# Patient Record
Sex: FEMALE | Race: WHITE | Employment: OTHER | URBAN - METROPOLITAN AREA
[De-identification: names, ages, dates, MRNs, and addresses within clinical notes are randomized per-mention and may not be internally consistent; named-entity substitution may affect disease eponyms.]

---

## 2017-01-23 ENCOUNTER — OP HISTORICAL/CONVERTED ENCOUNTER (OUTPATIENT)
Dept: OTHER | Age: 72
End: 2017-01-23

## 2017-02-20 ENCOUNTER — OP HISTORICAL/CONVERTED ENCOUNTER (OUTPATIENT)
Dept: OTHER | Age: 72
End: 2017-02-20

## 2018-01-24 ENCOUNTER — OP HISTORICAL/CONVERTED ENCOUNTER (OUTPATIENT)
Dept: OTHER | Age: 73
End: 2018-01-24

## 2018-02-06 ENCOUNTER — OP HISTORICAL/CONVERTED ENCOUNTER (OUTPATIENT)
Dept: OTHER | Age: 73
End: 2018-02-06

## 2018-02-20 ENCOUNTER — OP HISTORICAL/CONVERTED ENCOUNTER (OUTPATIENT)
Dept: OTHER | Age: 73
End: 2018-02-20

## 2018-03-06 ENCOUNTER — OP HISTORICAL/CONVERTED ENCOUNTER (OUTPATIENT)
Dept: OTHER | Age: 73
End: 2018-03-06

## 2018-03-06 LAB — AMB DEXA, EXTERNAL: NORMAL

## 2018-08-06 ENCOUNTER — OP HISTORICAL/CONVERTED ENCOUNTER (OUTPATIENT)
Dept: OTHER | Age: 73
End: 2018-08-06

## 2018-08-22 ENCOUNTER — OP HISTORICAL/CONVERTED ENCOUNTER (OUTPATIENT)
Dept: OTHER | Age: 73
End: 2018-08-22

## 2018-10-22 ENCOUNTER — ED HISTORICAL/CONVERTED ENCOUNTER (OUTPATIENT)
Dept: OTHER | Age: 73
End: 2018-10-22

## 2018-11-27 ENCOUNTER — OP HISTORICAL/CONVERTED ENCOUNTER (OUTPATIENT)
Dept: OTHER | Age: 73
End: 2018-11-27

## 2018-12-07 ENCOUNTER — OP HISTORICAL/CONVERTED ENCOUNTER (OUTPATIENT)
Dept: OTHER | Age: 73
End: 2018-12-07

## 2019-02-06 ENCOUNTER — OP HISTORICAL/CONVERTED ENCOUNTER (OUTPATIENT)
Dept: OTHER | Age: 74
End: 2019-02-06

## 2019-02-15 ENCOUNTER — OP HISTORICAL/CONVERTED ENCOUNTER (OUTPATIENT)
Dept: OTHER | Age: 74
End: 2019-02-15

## 2019-02-15 LAB — MAMMOGRAPHY, EXTERNAL: NORMAL

## 2019-02-20 LAB
A/G RATIO, EXTERNAL: 1.9
ALBUMIN, EXTERNAL: 4.4
ALK PHOS, EXTERNAL: 48
ANION GAP BLD CALC-SCNC: NORMAL MMOL/L
BILI DIRECT, EXTERNAL: NORMAL
BILI TOTAL, EXTERNAL: 0.8
BUN BLD-MCNC: 12 MG/DL
BUN/CREATININE RATIO, EXTERNAL: 14
CALCIUM, EXTERNAL: 9.2
CALCIUM, ION, EXTERNAL: NORMAL
CHLORIDE, EXTERNAL: 105
CO2, EXTERNAL: 23
CREATININE SER, EXTERNAL: 0.83
EGFR IF AFA, EXTERNAL: 82
EGFR NOT AFA, EXTERNAL: 71
GLOBULIN, EXTERNAL: 2.3
GLUCOSE SER, EXTERNAL: 88
POTASSIUM, EXTERNAL: 3.8
PROTEIN TOT, EXTERNAL: 6.7
SGOT (AST), EXTERNAL: 26
SGPT (ALT), EXTERNAL: 19
SODIUM, EXTERNAL: 143
TSH SERPL-ACNC: 2.15 M[IU]/L

## 2019-08-07 ENCOUNTER — OP HISTORICAL/CONVERTED ENCOUNTER (OUTPATIENT)
Dept: OTHER | Age: 74
End: 2019-08-07

## 2019-09-17 ENCOUNTER — ED HISTORICAL/CONVERTED ENCOUNTER (OUTPATIENT)
Dept: OTHER | Age: 74
End: 2019-09-17

## 2020-02-20 ENCOUNTER — OP HISTORICAL/CONVERTED ENCOUNTER (OUTPATIENT)
Dept: OTHER | Age: 75
End: 2020-02-20

## 2020-02-25 ENCOUNTER — IP HISTORICAL/CONVERTED ENCOUNTER (OUTPATIENT)
Dept: OTHER | Age: 75
End: 2020-02-25

## 2020-05-15 ENCOUNTER — OP HISTORICAL/CONVERTED ENCOUNTER (OUTPATIENT)
Dept: OTHER | Age: 75
End: 2020-05-15

## 2020-07-30 VITALS
OXYGEN SATURATION: 96 % | HEIGHT: 66 IN | HEART RATE: 70 BPM | TEMPERATURE: 98.1 F | SYSTOLIC BLOOD PRESSURE: 116 MMHG | BODY MASS INDEX: 27.8 KG/M2 | WEIGHT: 173 LBS | DIASTOLIC BLOOD PRESSURE: 58 MMHG

## 2020-07-30 PROBLEM — M94.9 DISORDER OF BONE AND ARTICULAR CARTILAGE: Status: ACTIVE | Noted: 2020-07-30

## 2020-07-30 PROBLEM — E78.00 PURE HYPERCHOLESTEROLEMIA: Status: ACTIVE | Noted: 2020-07-30

## 2020-07-30 PROBLEM — I48.91 ATRIAL FIBRILLATION (HCC): Status: ACTIVE | Noted: 2020-07-30

## 2020-07-30 PROBLEM — M89.9 DISORDER OF BONE AND ARTICULAR CARTILAGE: Status: ACTIVE | Noted: 2020-07-30

## 2020-07-30 PROBLEM — M54.9 BACK ACHE: Status: ACTIVE | Noted: 2020-07-30

## 2020-07-30 PROBLEM — E55.9 VITAMIN D DEFICIENCY: Status: ACTIVE | Noted: 2020-07-30

## 2020-07-30 PROBLEM — K21.9 GERD (GASTROESOPHAGEAL REFLUX DISEASE): Status: ACTIVE | Noted: 2020-07-30

## 2020-07-30 PROBLEM — M19.90 ARTHRITIS: Status: ACTIVE | Noted: 2020-07-30

## 2020-07-30 RX ORDER — HYDROCODONE BITARTRATE AND ACETAMINOPHEN 5; 325 MG/1; MG/1
TABLET ORAL
COMMUNITY
End: 2021-02-11 | Stop reason: ALTCHOICE

## 2020-07-30 RX ORDER — BACLOFEN 10 MG/1
TABLET ORAL 3 TIMES DAILY
COMMUNITY
End: 2021-08-18 | Stop reason: ALTCHOICE

## 2020-07-30 RX ORDER — GABAPENTIN 300 MG/1
300 CAPSULE ORAL 3 TIMES DAILY
COMMUNITY
End: 2021-08-18 | Stop reason: ALTCHOICE

## 2020-07-30 RX ORDER — GLUCOSAMINE/CHONDR SU A SOD 167-133 MG
500 CAPSULE ORAL 2 TIMES DAILY WITH MEALS
COMMUNITY

## 2020-07-30 RX ORDER — IBUPROFEN 200 MG
CAPSULE ORAL 2 TIMES DAILY
COMMUNITY

## 2020-07-30 RX ORDER — VITAMIN E 268 MG
CAPSULE ORAL DAILY
COMMUNITY

## 2020-07-30 RX ORDER — PANTOPRAZOLE SODIUM 40 MG/1
40 TABLET, DELAYED RELEASE ORAL DAILY
COMMUNITY
End: 2021-01-13

## 2020-07-30 RX ORDER — ROPINIROLE 2 MG/1
TABLET, FILM COATED ORAL
COMMUNITY
End: 2021-08-16

## 2020-07-30 RX ORDER — ATORVASTATIN CALCIUM 10 MG/1
TABLET, FILM COATED ORAL DAILY
COMMUNITY
End: 2020-08-10

## 2020-07-30 RX ORDER — ATENOLOL 50 MG/1
TABLET ORAL DAILY
COMMUNITY

## 2020-07-30 RX ORDER — DICLOFENAC POTASSIUM 50 MG/1
50 TABLET, FILM COATED ORAL 2 TIMES DAILY
COMMUNITY
End: 2021-08-18 | Stop reason: ALTCHOICE

## 2020-07-30 RX ORDER — IBANDRONATE SODIUM 150 MG/1
150 TABLET, FILM COATED ORAL
COMMUNITY
End: 2020-12-24

## 2020-08-10 RX ORDER — ATORVASTATIN CALCIUM 10 MG/1
TABLET, FILM COATED ORAL
Qty: 90 TAB | Refills: 4 | Status: SHIPPED | OUTPATIENT
Start: 2020-08-10 | End: 2021-08-16

## 2020-08-12 ENCOUNTER — OFFICE VISIT (OUTPATIENT)
Dept: FAMILY MEDICINE CLINIC | Age: 75
End: 2020-08-12
Payer: MEDICARE

## 2020-08-12 VITALS
TEMPERATURE: 96.2 F | WEIGHT: 179 LBS | OXYGEN SATURATION: 96 % | DIASTOLIC BLOOD PRESSURE: 74 MMHG | BODY MASS INDEX: 28.89 KG/M2 | SYSTOLIC BLOOD PRESSURE: 135 MMHG | HEART RATE: 77 BPM

## 2020-08-12 DIAGNOSIS — M54.50 CHRONIC BILATERAL LOW BACK PAIN, UNSPECIFIED WHETHER SCIATICA PRESENT: Primary | ICD-10-CM

## 2020-08-12 DIAGNOSIS — G89.29 CHRONIC BILATERAL LOW BACK PAIN, UNSPECIFIED WHETHER SCIATICA PRESENT: Primary | ICD-10-CM

## 2020-08-12 DIAGNOSIS — K21.9 GASTROESOPHAGEAL REFLUX DISEASE WITHOUT ESOPHAGITIS: ICD-10-CM

## 2020-08-12 DIAGNOSIS — M19.90 ARTHRITIS: ICD-10-CM

## 2020-08-12 DIAGNOSIS — E78.00 PURE HYPERCHOLESTEROLEMIA: ICD-10-CM

## 2020-08-12 DIAGNOSIS — G25.81 RESTLESS LEG SYNDROME: ICD-10-CM

## 2020-08-12 DIAGNOSIS — I48.91 ATRIAL FIBRILLATION, UNSPECIFIED TYPE (HCC): ICD-10-CM

## 2020-08-12 PROCEDURE — 99214 OFFICE O/P EST MOD 30 MIN: CPT | Performed by: FAMILY MEDICINE

## 2020-08-12 NOTE — PATIENT INSTRUCTIONS
General Health and concerns: HEART HEALTHY DIET: 
A heart healthy diet is one that is low in cholesterol (less than 300 mg daily), fat (less than 80 g daily) . You should also minimize carbohydrates / sugars (less amounts of breads, pastas, potato and potato products and sugary foods/snacks, cookies, cakes, etc) . Try to eat whole wheat/multigrain breads and pastas and eat more vegetables. Cook with olive oil (or no oil) and grill, bake, broil or boil foods. Less red meat and more chicken , fish and lean cuts of beef (limited). 7041-7622 calories per day is sufficient 1514-6394 is acceptable for weight loss. EXERCISE: 
You should do exercise 3-5 days per week (minimum) to include increasing your heart rate for 30 to 45 minutes. At least a pace of a brisk walk should do that. This build up your heart and lung endurance and muscles and helps many function of the body. OTHER: 
    Routine Health maintenance: You need to get a yearly follow up/physical exam to review, discuss age and gender appropriate exams, labs, vaccines and screening tests. This includes cardiovascular health risk, cancer screens and other pierce related topics. Medications-take all medications as directed. Please do not stop unless you talk to your doctor or health care provider first. Report any problems immediately. Referrals: if you have been given a referral, please call the office if you do not hear from provider in one week. You may make the appointment yourself. Please keep all appointments with specialists and ask them to send their notes, thoughts, recommendations to us , as your PCP. Imaging/Labs:  Be sure to get these images in a timely manner. IF your test must be scheduled, let us know if you need help getting this done and if you do not hear from that provider in a week , call us or them.   BE SURE to call the office if you do not hear regarding the results in one week after the test is performed Image or lab). It is our intention to inform you of the results ALWAYS, even if normal you should get a notification (Call, portal message). PLEASE laury if you do not get the results. PLEASE follow all recommendations and call/come in /ask questions if you do not understand of if problems develop after or in between visits. Failure to comply with recommended health care advise could result in serious health consequences. Thank you for choosing our practice and please let us know how we can help you feel better and stay well!

## 2020-08-12 NOTE — PROGRESS NOTES
Subjective  Dede Wolfe is a 69 y.o. female. HPI   Patient Active Problem List   Diagnosis Code    Arthritis M19.90    Atrial fibrillation ([facility] [address] I48.91    Back ache M54.9    Disorder of bone and articular cartilage M89.9, M94.9    GERD (gastroesophageal reflux disease) K21.9    Pure hypercholesterolemia E78.00    Vitamin D deficiency E55.9     Enio Frazier comes ni for a follow up on the above medical problems. Complains of restless legs, takes her two hours to fall asleep EVEN while on the Ropinerol for rls. Starting to feel in arms. Also complains of weihgt going up Had back surgery in February this year and not been as mobile. Has no leg pain or weakness. Review of Systems   Constitutional: Positive for malaise/fatigue. Negative for chills, diaphoresis, fever and weight loss (Inability to lose weight). HENT: Negative for congestion, ear pain, hearing loss, sore throat and tinnitus. Eyes: Negative for blurred vision, double vision and photophobia. Respiratory: Negative for cough, sputum production, shortness of breath and wheezing. Cardiovascular: Negative for chest pain, palpitations, orthopnea and leg swelling. Gastrointestinal: Negative for abdominal pain, blood in stool, constipation, diarrhea, heartburn, melena, nausea and vomiting. Genitourinary: Negative for dysuria, frequency and urgency. Musculoskeletal: Positive for back pain. Negative for joint pain, myalgias and neck pain. Skin: Negative for itching and rash. Neurological: Negative for dizziness, tingling, tremors, sensory change, focal weakness and headaches. Restlessness. Legs at bedtime and now arms. Psychiatric/Behavioral: Negative for depression and suicidal ideas. The patient is not nervous/anxious and does not have insomnia.         Objective  Visit Vitals  /74 (BP 1 Location: Left arm, BP Patient Position: Sitting)   Pulse 77   Temp (!) 96.2 °F (35.7 °C) (Temporal)   Wt 179 lb (81.2 kg)   SpO2 96% BMI 28.89 kg/m²       Physical Exam  Constitutional:       General: She is not in acute distress. Appearance: Normal appearance. She is obese. HENT:      Nose: No congestion or rhinorrhea. Mouth/Throat:      Mouth: Mucous membranes are moist.      Pharynx: Oropharynx is clear. No oropharyngeal exudate or posterior oropharyngeal erythema. Eyes:      General: No scleral icterus. Extraocular Movements: Extraocular movements intact. Conjunctiva/sclera: Conjunctivae normal.      Pupils: Pupils are equal, round, and reactive to light. Neck:      Musculoskeletal: No muscular tenderness. Vascular: No carotid bruit. Cardiovascular:      Rate and Rhythm: Normal rate and regular rhythm. Heart sounds: No murmur. No friction rub. No gallop. Pulmonary:      Effort: Pulmonary effort is normal.      Breath sounds: Normal breath sounds. No wheezing, rhonchi or rales. Abdominal:      General: Bowel sounds are normal. There is no distension. Palpations: Abdomen is soft. There is no mass. Tenderness: There is no abdominal tenderness. Musculoskeletal:         General: No swelling (NO swelling but multiple varicosities), tenderness or deformity. Right lower leg: No edema. Left lower leg: No edema. Comments: Tightness and soreness lower lumbar but no severe pain per se. Lymphadenopathy:      Cervical: No cervical adenopathy. Skin:     Coloration: Skin is not jaundiced. Findings: No erythema or rash. Neurological:      General: No focal deficit present. Mental Status: She is alert and oriented to person, place, and time. Cranial Nerves: No cranial nerve deficit. Sensory: No sensory deficit. Motor: No weakness. Coordination: Coordination normal.      Gait: Gait normal.   Psychiatric:         Mood and Affect: Mood normal.         Behavior: Behavior normal.         Thought Content: Thought content normal.          Assessment & Plan  1. Chronic bilateral low back pain, unspecified whether sciatica present  She is healing from recent L3-5 cage fusion. Still sore but improving. 2. Pure hypercholesterolemia  Continue medication. Follow-up labs. - TSH 3RD GENERATION  - CBC WITH AUTOMATED DIFF  - METABOLIC PANEL, COMPREHENSIVE  - LIPID PANEL    3. Atrial fibrillation, unspecified type ([facility] [address]  This seems stable currently. Continue with plan of care. 4. Gastroesophageal reflux disease without esophagitis  Current issues per se. 5. Arthritis    Stable, continue medications. 6. Restless leg syndrome  This is 1 of the major problems that she has had and I recommended to the following;  A. Try taking the ropinirole about 7 or 8:00 PM.  She takes it right at bedtime and it generally will take 45 minutes to an hour to start working anyway. So hopefully a few hours before bedtime allowed to start working and later following the sleep. B.  Consider an alternative medicine in the same family or altogether different treatment  C. Consider consultation with a neurologist.  - SED RATE (ESR)  - JAYLA BY MULTIPLEX FLOW IA, QL    Follow-up and Dispositions    · Return in about 6 months (around 2/12/2021) for follow up rls, back,  gerd, lipids. The patient and I discussed the following;  Each diagnosis listed for today's visit including medications, treatment, testing such as labs, imagine, referrals and when to call regarding results and appointments. Reminded patient to keep any and all appointments with specialists, labs, imaging. Reminded patient to make sure we get copies of any specialists care, labs and imaging. Reminded patient to call of come by the office if there are any concerns, questions , comments or problems. The patient verbalized understanding of the care plan and all questions were answered to the patient's satisfaction prior to leaving the office.    The patient was told that failure to comply with recommended testing could result in abnormal health consequences. The patient was instructed to have yearly routine health maintenance including but not limited to age appropriate vaccines, testing, screening exams. The patient actively participated in medical decision making. This visit may have been completed , in part, with voice recognition software and may have syntax errors despite editing.       Merlin Luciano, DO

## 2020-08-13 LAB
ALBUMIN SERPL-MCNC: 4.3 G/DL (ref 3.7–4.7)
ALBUMIN/GLOB SERPL: 1.8 {RATIO} (ref 1.2–2.2)
ALP SERPL-CCNC: 47 IU/L (ref 39–117)
ALT SERPL-CCNC: 19 IU/L (ref 0–32)
ANA SER QL: NEGATIVE
AST SERPL-CCNC: 23 IU/L (ref 0–40)
BASOPHILS # BLD AUTO: 0 X10E3/UL (ref 0–0.2)
BASOPHILS NFR BLD AUTO: 1 %
BILIRUB SERPL-MCNC: 0.7 MG/DL (ref 0–1.2)
BUN SERPL-MCNC: 11 MG/DL (ref 8–27)
BUN/CREAT SERPL: 14 (ref 12–28)
CALCIUM SERPL-MCNC: 8.7 MG/DL (ref 8.7–10.3)
CHLORIDE SERPL-SCNC: 106 MMOL/L (ref 96–106)
CHOLEST SERPL-MCNC: 166 MG/DL (ref 100–199)
CO2 SERPL-SCNC: 23 MMOL/L (ref 20–29)
CREAT SERPL-MCNC: 0.77 MG/DL (ref 0.57–1)
EOSINOPHIL # BLD AUTO: 0.2 X10E3/UL (ref 0–0.4)
EOSINOPHIL NFR BLD AUTO: 4 %
ERYTHROCYTE [DISTWIDTH] IN BLOOD BY AUTOMATED COUNT: 16 % (ref 11.7–15.4)
ERYTHROCYTE [SEDIMENTATION RATE] IN BLOOD BY WESTERGREN METHOD: 6 MM/HR (ref 0–40)
GLOBULIN SER CALC-MCNC: 2.4 G/DL (ref 1.5–4.5)
GLUCOSE SERPL-MCNC: 92 MG/DL (ref 65–99)
HCT VFR BLD AUTO: 31.5 % (ref 34–46.6)
HDLC SERPL-MCNC: 46 MG/DL
HGB BLD-MCNC: 9.8 G/DL (ref 11.1–15.9)
IMM GRANULOCYTES # BLD AUTO: 0 X10E3/UL (ref 0–0.1)
IMM GRANULOCYTES NFR BLD AUTO: 0 %
LDLC SERPL CALC-MCNC: 87 MG/DL (ref 0–99)
LYMPHOCYTES # BLD AUTO: 1.5 X10E3/UL (ref 0.7–3.1)
LYMPHOCYTES NFR BLD AUTO: 32 %
MCH RBC QN AUTO: 26.2 PG (ref 26.6–33)
MCHC RBC AUTO-ENTMCNC: 31.1 G/DL (ref 31.5–35.7)
MCV RBC AUTO: 84 FL (ref 79–97)
MONOCYTES # BLD AUTO: 0.5 X10E3/UL (ref 0.1–0.9)
MONOCYTES NFR BLD AUTO: 11 %
NEUTROPHILS # BLD AUTO: 2.3 X10E3/UL (ref 1.4–7)
NEUTROPHILS NFR BLD AUTO: 52 %
PLATELET # BLD AUTO: 256 X10E3/UL (ref 150–450)
POTASSIUM SERPL-SCNC: 4.1 MMOL/L (ref 3.5–5.2)
PROT SERPL-MCNC: 6.7 G/DL (ref 6–8.5)
RBC # BLD AUTO: 3.74 X10E6/UL (ref 3.77–5.28)
SODIUM SERPL-SCNC: 145 MMOL/L (ref 134–144)
TRIGL SERPL-MCNC: 167 MG/DL (ref 0–149)
TSH SERPL DL<=0.005 MIU/L-ACNC: 2.09 UIU/ML (ref 0.45–4.5)
VLDLC SERPL CALC-MCNC: 33 MG/DL (ref 5–40)
WBC # BLD AUTO: 4.6 X10E3/UL (ref 3.4–10.8)

## 2020-08-14 NOTE — PROGRESS NOTES
Hemoglobin is down, that can make you feel bad. I recommend iron over the counter, like slo fe 150 mg twice daily for a few months. Thyroid, liver, kidneys are normal  Sed rate and cholesterol are normal.  JAYLA is normal no autoimmune issues.

## 2020-10-21 ENCOUNTER — OFFICE VISIT (OUTPATIENT)
Dept: FAMILY MEDICINE CLINIC | Age: 75
End: 2020-10-21
Payer: MEDICARE

## 2020-10-21 VITALS
SYSTOLIC BLOOD PRESSURE: 131 MMHG | TEMPERATURE: 97.3 F | BODY MASS INDEX: 28.61 KG/M2 | OXYGEN SATURATION: 96 % | WEIGHT: 178 LBS | HEIGHT: 66 IN | DIASTOLIC BLOOD PRESSURE: 79 MMHG | HEART RATE: 69 BPM

## 2020-10-21 DIAGNOSIS — L73.1 INGROWN HAIR: ICD-10-CM

## 2020-10-21 DIAGNOSIS — L02.91 ABSCESS: Primary | ICD-10-CM

## 2020-10-21 PROCEDURE — 99213 OFFICE O/P EST LOW 20 MIN: CPT | Performed by: FAMILY MEDICINE

## 2020-10-21 RX ORDER — DOXYCYCLINE 100 MG/1
100 CAPSULE ORAL 2 TIMES DAILY
Qty: 20 CAP | Refills: 0 | Status: SHIPPED | OUTPATIENT
Start: 2020-10-21 | End: 2020-10-31

## 2020-10-21 RX ORDER — MUPIROCIN 20 MG/G
OINTMENT TOPICAL DAILY
Qty: 22 G | Refills: 0 | Status: SHIPPED | OUTPATIENT
Start: 2020-10-21 | End: 2021-02-11 | Stop reason: ALTCHOICE

## 2020-10-21 NOTE — PATIENT INSTRUCTIONS
     
Routine Health maintenance: You need to get a yearly follow up/physical exam to review, discuss age and gender appropriate exams, labs, vaccines and screening tests. This includes cardiovascular health risk, cancer screens and other pierce related topics. Medications-take all medications as directed. Please do not stop unless you talk to your doctor or health care provider first. Report any problems immediately. Referrals: if you have been given a referral, please call the office if you do not hear from provider in one week. You may make the appointment yourself. Please keep all appointments with specialists and ask them to send their notes, thoughts, recommendations to us , as your PCP. Imaging/Labs:  Be sure to get these images in a timely manner. IF your test must be scheduled, let us know if you need help getting this done and if you do not hear from that provider in a week , call us or them.   BE SURE to call the office if you do not hear regarding the results in one week after the test is performed Image or lab). It is our intention to inform you of the results ALWAYS, even if normal you should get a notification (Call, portal message). PLEASE laury if you do not get the results. PLEASE follow all recommendations and call/come in /ask questions if you do not understand of if problems develop after or in between visits. Failure to comply with recommended health care advise could result in serious health consequences. Thank you for choosing our practice and please let us know how we can help you feel better and stay well!

## 2020-10-21 NOTE — PROGRESS NOTES
.  ID:  Camryn Luis. Sara Newton , 70 y.o., female  CC:   Chief Complaint   Patient presents with    Cyst     c/o bump in right groin x 2 week. It is tender to the touch and bleeds at times. Patient Active Problem List   Diagnosis Code    Arthritis M19.90    Atrial fibrillation ([facility] [address] I48.91    Back ache M54.9    Disorder of bone and articular cartilage M89.9, M94.9    GERD (gastroesophageal reflux disease) K21.9    Pure hypercholesterolemia E78.00    Vitamin D deficiency E55.9    Restless leg syndrome G25.81       HPI:    Catherine Iverson is a 70 y.o. female with the above listed medical problems whom comes in today for bump on right inguinal area, thinks it could be a bug bite, itches some , noticed last week. Has some tenderness and some bleeding. She can feel it and it is sore to touch. She notes no pus per se. ALLERGIES:   No Known Allergies      MEDICATIONS:     Current Outpatient Medications:     atorvastatin (LIPITOR) 10 mg tablet, TAKE 1 TABLET BY MOUTH EVERY DAY, Disp: 90 Tab, Rfl: 4    atenoloL (TENORMIN) 50 mg tablet, Take  by mouth daily. , Disp: , Rfl:     baclofen (LIORESAL) 10 mg tablet, Take  by mouth three (3) times daily. , Disp: , Rfl:     calcium citrate 200 mg (950 mg) tablet, Take  by mouth two (2) times a day., Disp: , Rfl:     diclofenac potassium (CATAFLAM) 50 mg tablet, Take 50 mg by mouth two (2) times a day., Disp: , Rfl:     gabapentin (NEURONTIN) 300 mg capsule, Take 300 mg by mouth three (3) times daily. , Disp: , Rfl:     HYDROcodone-acetaminophen (NORCO) 5-325 mg per tablet, Take  by mouth., Disp: , Rfl:     ibandronate (BONIVA) 150 mg tablet, Take 150 mg by mouth every thirty (30) days. , Disp: , Rfl:     nicotinic acid (NIACIN) 500 mg tablet, Take 500 mg by mouth two (2) times daily (with meals). , Disp: , Rfl:     pantoprazole (PROTONIX) 40 mg tablet, Take 40 mg by mouth daily. , Disp: , Rfl:     rOPINIRole (REQUIP) 2 mg tablet, Take  by mouth nightly. , Disp: , Rfl:     vitamin E (AQUA GEMS) 400 unit capsule, Take  by mouth daily. , Disp: , Rfl:     rivaroxaban (Xarelto) 10 mg tablet, Take  by mouth daily. , Disp: , Rfl:     SOCIAL:   Social History     Tobacco Use    Smoking status: Never Smoker    Smokeless tobacco: Never Used   Substance Use Topics    Alcohol use: Not Currently    Drug use: Never       SURGERIES:   Past Surgical History:   Procedure Laterality Date    CARDIAC SURG PROCEDURE UNLIST  2014    a-fib procedure    HX CATARACT REMOVAL  11/2012    HX COLONOSCOPY  01/26/2012    HX ORTHOPAEDIC  12/01/2018    hernated disc repair    HX ORTHOPAEDIC  02/25/2020    lumbar surgery-L2-5 cage fusion    HX OTHER SURGICAL Bilateral 08/2017    Bilateral Laser Treatment    HX TUBAL LIGATION  1981          FAMILY HX:  Family History   Problem Relation Age of Onset    Cancer Mother         breast    Heart Disease Father          Review of systems:   I personally collected this information from the patient , available records and family members present-[email or web address]  Review of Systems   Constitutional: Negative. HENT: Negative. Respiratory: Negative. Cardiovascular: Negative. Gastrointestinal: Negative. Skin: Negative for color change and rash. Complains of the area noted on history of present illness. Vitals:   Visit Vitals  /79 (BP 1 Location: Left arm, BP Patient Position: Sitting)   Pulse 69   Temp 97.3 °F (36.3 °C) (Temporal)   Ht 5' 6\" (1.676 m)   Wt 178 lb (80.7 kg)   SpO2 96%   BMI 28.73 kg/m²           PHYSICAL:   Physical Exam  Constitutional:       General: She is not in acute distress. Appearance: Normal appearance. She is obese. HENT:      Mouth/Throat:      Pharynx: No oropharyngeal exudate or posterior oropharyngeal erythema. Eyes:      General: No scleral icterus. Neck:      Musculoskeletal: No neck rigidity or muscular tenderness. Vascular: No carotid bruit.    Cardiovascular:      Rate and Rhythm: Normal rate and regular rhythm. Heart sounds: No murmur. No friction rub. No gallop. Pulmonary:      Effort: Pulmonary effort is normal.      Breath sounds: Normal breath sounds. No wheezing, rhonchi or rales. Lymphadenopathy:      Cervical: No cervical adenopathy. Skin:     Comments: Back area about a centimeter over from the inguinal canal there is a dark slightly raised 2 x 4 mm area that has a dark ingrown hair. It is sore to touch. There is also a large pedunculated 6mm tag in the inguinal canal.  She identifies the area in question as the darker area. Neurological:      Mental Status: She is alert. ASSESSMENT/PLAN:        ICD-10-CM ICD-9-CM    1. Abscess  L02.91 682.9 mupirocin (BACTROBAN) 2 % ointment      doxycycline (VIBRAMYCIN) 100 mg capsule   2. Ingrown hair  L73.1 704.8 mupirocin (BACTROBAN) 2 % ointment      doxycycline (VIBRAMYCIN) 100 mg capsule     We gave her antibiotics topical and oral.  If this does not improve within the next 7 to 10 days she will let me know. He may need tweezed out or slightly opened up but definitely will need further evaluation if continues to exist.        Discussion:    The patient and I discussed the following items/issues; Each diagnosis listed for today's visit including medications, treatment, testing such as labs, imagine, referrals and when to call regarding results and appointments. Reminded patient to keep any and all appointments with specialists, labs, imaging. Reminded patient to make sure we get copies of any specialists care, labs and imaging. Reminded patient to call of come by the office if there are any concerns, questions , comments or problems. The patient verbalized understanding of the care plan and all questions were answered to the patient's satisfaction prior to leaving the office. The patient was told that failure to comply with recommended testing could result in abnormal health consequences.     The patient was instructed to have yearly routine health maintenance including but not limited to age appropriate vaccines, testing, screening exams. The patient actively participated in medical decision making. FOLLOW UP:   Patient knows to keep any and all future visits scheduled unless told otherwise. Patient knows to call, come back if any concerns, questions, comments or problems arise. Follow-up and Dispositions    · Return if symptoms worsen or fail to improve. This visit may have been completed , in part, with voice recognition software as well as typing and may have syntax errors despite editing.       Janeth Briggs, DO

## 2020-12-24 RX ORDER — IBANDRONATE SODIUM 150 MG/1
TABLET, FILM COATED ORAL
Qty: 3 TAB | Refills: 6 | Status: SHIPPED | OUTPATIENT
Start: 2020-12-24 | End: 2021-11-26 | Stop reason: SDUPTHER

## 2020-12-24 RX ORDER — DICLOFENAC SODIUM 50 MG/1
TABLET, DELAYED RELEASE ORAL
Qty: 180 TAB | Refills: 4 | Status: SHIPPED | OUTPATIENT
Start: 2020-12-24 | End: 2021-11-26 | Stop reason: SDUPTHER

## 2021-01-13 RX ORDER — PANTOPRAZOLE SODIUM 40 MG/1
TABLET, DELAYED RELEASE ORAL
Qty: 90 TAB | Refills: 3 | Status: SHIPPED | OUTPATIENT
Start: 2021-01-13 | End: 2021-11-26 | Stop reason: SDUPTHER

## 2021-01-28 ENCOUNTER — TRANSCRIBE ORDER (OUTPATIENT)
Dept: SCHEDULING | Age: 76
End: 2021-01-28

## 2021-01-28 DIAGNOSIS — Z12.31 SCREENING MAMMOGRAM FOR HIGH-RISK PATIENT: Primary | ICD-10-CM

## 2021-02-11 ENCOUNTER — OFFICE VISIT (OUTPATIENT)
Dept: FAMILY MEDICINE CLINIC | Age: 76
End: 2021-02-11
Payer: MEDICARE

## 2021-02-11 VITALS
TEMPERATURE: 97.8 F | HEIGHT: 66 IN | SYSTOLIC BLOOD PRESSURE: 131 MMHG | WEIGHT: 182 LBS | HEART RATE: 77 BPM | OXYGEN SATURATION: 93 % | BODY MASS INDEX: 29.25 KG/M2 | DIASTOLIC BLOOD PRESSURE: 72 MMHG

## 2021-02-11 DIAGNOSIS — E78.00 PURE HYPERCHOLESTEROLEMIA: ICD-10-CM

## 2021-02-11 DIAGNOSIS — M54.50 CHRONIC BILATERAL LOW BACK PAIN, UNSPECIFIED WHETHER SCIATICA PRESENT: Primary | ICD-10-CM

## 2021-02-11 DIAGNOSIS — G25.81 RESTLESS LEG SYNDROME: ICD-10-CM

## 2021-02-11 DIAGNOSIS — M19.90 ARTHRITIS: ICD-10-CM

## 2021-02-11 DIAGNOSIS — K21.9 GASTROESOPHAGEAL REFLUX DISEASE WITHOUT ESOPHAGITIS: ICD-10-CM

## 2021-02-11 DIAGNOSIS — I48.91 ATRIAL FIBRILLATION, UNSPECIFIED TYPE (HCC): ICD-10-CM

## 2021-02-11 DIAGNOSIS — G89.29 CHRONIC BILATERAL LOW BACK PAIN, UNSPECIFIED WHETHER SCIATICA PRESENT: Primary | ICD-10-CM

## 2021-02-11 PROCEDURE — 1101F PT FALLS ASSESS-DOCD LE1/YR: CPT | Performed by: FAMILY MEDICINE

## 2021-02-11 PROCEDURE — G8427 DOCREV CUR MEDS BY ELIG CLIN: HCPCS | Performed by: FAMILY MEDICINE

## 2021-02-11 PROCEDURE — G8399 PT W/DXA RESULTS DOCUMENT: HCPCS | Performed by: FAMILY MEDICINE

## 2021-02-11 PROCEDURE — 99213 OFFICE O/P EST LOW 20 MIN: CPT | Performed by: FAMILY MEDICINE

## 2021-02-11 PROCEDURE — 3017F COLORECTAL CA SCREEN DOC REV: CPT | Performed by: FAMILY MEDICINE

## 2021-02-11 PROCEDURE — G9899 SCRN MAM PERF RSLTS DOC: HCPCS | Performed by: FAMILY MEDICINE

## 2021-02-11 PROCEDURE — G8510 SCR DEP NEG, NO PLAN REQD: HCPCS | Performed by: FAMILY MEDICINE

## 2021-02-11 PROCEDURE — G8536 NO DOC ELDER MAL SCRN: HCPCS | Performed by: FAMILY MEDICINE

## 2021-02-11 PROCEDURE — 1090F PRES/ABSN URINE INCON ASSESS: CPT | Performed by: FAMILY MEDICINE

## 2021-02-11 PROCEDURE — G8419 CALC BMI OUT NRM PARAM NOF/U: HCPCS | Performed by: FAMILY MEDICINE

## 2021-02-11 NOTE — PROGRESS NOTES
IIDENTIFYING INFORMATION:  Hawa Reese , 70 y.o., female  [address] [address]     CHIEF COMPLAINT:   Chief Complaint   Patient presents with    Back Pain    GERD    Cholesterol Problem     HISTORY OF PRESENT ILLNESS:  Beverlie Brittle is a 70 y.o. female with the below listed past medical history and comes in to the office today for a 6 month follow up. Saw cardiologist last month. She says she got a good report. She has no chest pain, orthopnea, PND. She has no leg pain or edema. Her restless legs are still bothering her. However, she started taking the Requip about 2 to 3 hours before bed and it helps tremendously better and that by bedtime her legs are calm down the. She has no fever, chills, sweats. Her medications have been reviewed and refills are up-to-date. She feels like this regimen is appropriate and without side effects. Lab work has been done within the last 6 months and is reviewed. She does have a mammogram scheduled for next month. She is up-to-date on her colonoscopy screening. Her back pain from surgery and disc disease is doing much better now. She has no major complaints overall. ALLERGIES:   No Known Allergies    MEDICATIONS:   Current Outpatient Medications on File Prior to Visit   Medication Sig Dispense Refill    pantoprazole (PROTONIX) 40 mg tablet TAKE 1 TABLET BY MOUTH EVERY DAY 90 Tab 3    diclofenac EC (VOLTAREN) 50 mg EC tablet TAKE 1 TABLET BY MOUTH TWICE A  Tab 4    ibandronate (BONIVA) 150 mg tablet TAKE 1 TABLET BY MOUTH ONCE A MONTH 3 Tab 6    atorvastatin (LIPITOR) 10 mg tablet TAKE 1 TABLET BY MOUTH EVERY DAY 90 Tab 4    atenoloL (TENORMIN) 50 mg tablet Take  by mouth daily.  baclofen (LIORESAL) 10 mg tablet Take  by mouth three (3) times daily.  calcium citrate 200 mg (950 mg) tablet Take  by mouth two (2) times a day.  diclofenac potassium (CATAFLAM) 50 mg tablet Take 50 mg by mouth two (2) times a day.  gabapentin (NEURONTIN) 300 mg capsule Take 300 mg by mouth three (3) times daily.  nicotinic acid (NIACIN) 500 mg tablet Take 500 mg by mouth two (2) times daily (with meals).  rOPINIRole (REQUIP) 2 mg tablet Take  by mouth nightly.  vitamin E (AQUA GEMS) 400 unit capsule Take  by mouth daily.  rivaroxaban (Xarelto) 10 mg tablet Take  by mouth daily.  [DISCONTINUED] mupirocin (BACTROBAN) 2 % ointment Apply  to affected area daily. 22 g 0    [DISCONTINUED] HYDROcodone-acetaminophen (NORCO) 5-325 mg per tablet Take  by mouth. No current facility-administered medications on file prior to visit.         PAST MEDICAL HISTORY:  Past Medical History:   Diagnosis Date    Arthritis 7/30/2020    Atrial fibrillation ([address] 7/30/2020    Back ache 7/30/2020    Disorder of bone and articular cartilage 7/30/2020    GERD (gastroesophageal reflux disease) 7/30/2020    Pure hypercholesterolemia 7/30/2020    Vitamin D deficiency 7/30/2020       SOCIAL HISTORY:   Social History     Tobacco Use    Smoking status: Never Smoker    Smokeless tobacco: Never Used   Substance Use Topics    Alcohol use: Not Currently    Drug use: Never       SURGICAL HISTORY:  Past Surgical History:   Procedure Laterality Date    HX CATARACT REMOVAL  11/2012    HX COLONOSCOPY  01/26/2012    HX ORTHOPAEDIC  12/01/2018    hernated disc repair    HX ORTHOPAEDIC  02/25/2020    lumbar surgery-L2-5 cage fusion    HX OTHER SURGICAL Bilateral 08/2017    Bilateral Laser Treatment    HX OTHER SURGICAL  01/2021    gum surgery    HX TUBAL LIGATION  1981    [place] CARDIAC SURG PROCEDURE UNLIST  2014    a-fib procedure        FAMILY HISTORY:  Family History   Problem Relation Age of Onset    Cancer Mother         breast    Heart Disease Father          REVIEW OF SYSTEMS:  I personally collected this information from all available source present (patient/others in room and records available) -[email or web address]    Review of Systems Constitutional: Negative for activity change, appetite change, chills, diaphoresis, fever and unexpected weight change. HENT: Negative for congestion, ear discharge, ear pain, hearing loss, rhinorrhea, sinus pressure, sneezing, sore throat, tinnitus, trouble swallowing and voice change. Eyes: Negative for photophobia, pain, discharge and visual disturbance. Respiratory: Negative for cough, chest tightness, shortness of breath and wheezing. Cardiovascular: Negative for chest pain, palpitations and leg swelling. Gastrointestinal: Negative for abdominal distention, abdominal pain, blood in stool, constipation, diarrhea, nausea and vomiting. Endocrine: Negative for cold intolerance, heat intolerance, polydipsia and polyphagia. Genitourinary: Negative for difficulty urinating, dysuria, frequency, hematuria and urgency. Musculoskeletal: Positive for arthralgias and back pain. Negative for gait problem, joint swelling, myalgias and neck pain. Skin: Negative for color change and rash. Neurological: Negative for dizziness, tremors, syncope, speech difficulty, weakness, light-headedness, numbness and headaches. Hematological: Negative for adenopathy. Does not bruise/bleed easily. Psychiatric/Behavioral: Negative for agitation, behavioral problems, confusion, decreased concentration, dysphoric mood, hallucinations, sleep disturbance and suicidal ideas. The patient is not nervous/anxious. PHYSICAL EXAMINATION:  Vital Signs:    Visit Vitals  /72 (BP 1 Location: Left upper arm, BP Patient Position: Sitting)   Pulse 77   Temp 97.8 °F (36.6 °C) (Temporal)   Ht 5' 6\" (1.676 m)   Wt 182 lb (82.6 kg)   SpO2 93%   BMI 29.38 kg/m²         Wt Readings from Last 3 Encounters:   02/11/21 182 lb (82.6 kg)   10/21/20 178 lb (80.7 kg)   08/12/20 179 lb (81.2 kg)     BP Readings from Last 3 Encounters:   02/11/21 131/72   10/21/20 131/79   08/12/20 135/74         Physical Exam  Nursing note reviewed. Constitutional:       General: She is not in acute distress. Appearance: Normal appearance. She is obese. She is not ill-appearing or toxic-appearing. HENT:      Left Ear: Ear canal normal.   Eyes:      General: No scleral icterus. Extraocular Movements: Extraocular movements intact. Conjunctiva/sclera: Conjunctivae normal.      Pupils: Pupils are equal, round, and reactive to light. Neck:      Musculoskeletal: No neck rigidity or muscular tenderness. Vascular: No carotid bruit. Cardiovascular:      Rate and Rhythm: Normal rate and regular rhythm. Heart sounds: No murmur. No friction rub. No gallop. Pulmonary:      Effort: Pulmonary effort is normal.      Breath sounds: Normal breath sounds. No wheezing, rhonchi or rales. Abdominal:      General: Bowel sounds are normal. There is no distension. Palpations: Abdomen is soft. There is no mass. Tenderness: There is no abdominal tenderness. Musculoskeletal:         General: Tenderness and deformity present. No swelling. Right lower leg: No edema. Left lower leg: No edema. Lymphadenopathy:      Cervical: No cervical adenopathy. Skin:     Capillary Refill: Capillary refill takes less than 2 seconds. Coloration: Skin is not jaundiced. Findings: No erythema or rash. Neurological:      General: No focal deficit present. Mental Status: She is alert and oriented to person, place, and time. Mental status is at baseline. Cranial Nerves: No cranial nerve deficit. Sensory: No sensory deficit. Coordination: Coordination normal.      Gait: Gait normal.   Psychiatric:         Mood and Affect: Mood normal.         Behavior: Behavior normal.         Thought Content: Thought content normal.         Judgment: Judgment normal.         ASSESSMENT/PLAN:    Discussion (regarding today's visit with Rhina Mcardle); Yanely Meneses will continue her medications as listed. She will call for refills.   She will continue her current level of activity. We will see her in 6 months for her follow-up yearly/wellness visit. ICD-10-CM ICD-9-CM    1. Chronic bilateral low back pain, unspecified whether sciatica present  M54.5 724.2     G89.29 338.29    2. Pure hypercholesterolemia  E78.00 272.0    3. Atrial fibrillation, unspecified type ([facility]  I48.91 427.31    4. Gastroesophageal reflux disease without esophagitis  K21.9 530.81    5. Arthritis  M19.90 716.90    6. Restless leg syndrome  G25.81 333.94      Each diagnosis listed for today's visit including medications, treatment, testing such as labs, imagine, referrals and when to call regarding results and appointments. Reminded patient to keep any and all appointments with specialists, labs, imaging. Reminded patient to make sure we get copies of any specialists care, labs and imaging. Reminded patient to call of come by the office if there are any concerns, questions , comments or problems. The patient verbalized understanding of the care plan and all questions were answered to the patient's satisfaction prior to leaving the office. The patient was told that failure to comply with recommended testing could result in abnormal health consequences. The patient was instructed to have yearly routine health maintenance including but not limited to age appropriate vaccines, testing, screening exams. ALL questions were answered to her satisfaction before leaving the office. The patient actively participated in medical decision making. FOLLOW UP:   Patient knows to keep any and all future visits scheduled unless told otherwise. Patient knows to call, come back if any concerns, questions, comments or problems arise. Follow-up and Dispositions    · Return in about 6 months (around 8/11/2021) for follow up bp, oa, rls and medicare wellness.              This visit may have been completed , in part, with voice recognition software as well as typing and may have syntax errors despite editing.       Cortney Brown, DO

## 2021-02-11 NOTE — PATIENT INSTRUCTIONS
General Health and concerns: 
HEART HEALTHY DIET: 
A heart healthy diet is one that is low in cholesterol (less than 300 mg daily), fat (less than 80 g daily) . You should also minimize carbohydrates / sugars (less amounts of breads, pastas, potato and potato products and sugary foods/snacks, cookies, cakes, etc) . Try to eat whole wheat/multigrain breads and pastas and eat more vegetables.  Cook with olive oil (or no oil) and grill, bake, broil or boil foods. Less red meat and more chicken , fish and lean cuts of beef (limited).  9236-5842 calories per day is sufficient [id number] is acceptable for weight loss.   
 
EXERCISE: 
You should do exercise 3-5 days per week (minimum) to include increasing your heart rate for 30 to 45 minutes. At least a pace of a brisk walk should do that.  This build up your heart and lung endurance and muscles and helps many function of the body.   
 
 
OTHER: 
     
Routine Health maintenance: 
         You need to get a yearly follow up/physical exam to review, discuss age and gender appropriate exams, labs, vaccines and screening tests. This includes cardiovascular health risk, cancer screens and other pierce related topics.  
     
Medications-Take all medications as directed.  Please do not stop unless you talk to your doctor or health care provider first. Report any problems immediately. 
     
Referrals: if you have been given a referral, please call the office if you do not hear from provider in one week.  You may make the appointment yourself. Please keep all appointments with specialists and ask them to send their notes, thoughts, recommendations to us , as your PCP. 
     
 Imaging/Labs:  Be sure to get these images in a timely manner. IF your test must be scheduled, let us know if you need help getting this done and if you do not hear from that provider in a week , call us or them. BE SURE to call the office if you do not hear regarding the results in one week after the test is performed Image or lab). It is our intention to inform you of the results ALWAYS, even if normal you should get a notification (Call, portal message). PLEASE laury if you do not get the results. PLEASE follow all recommendations and call/come in /ask questions if you do not understand of if problems develop after or in between visits. Failure to comply with recommended health care advise could result in serious health consequences. Thank you for choosing our practice and please let us know how we can help you feel better and stay well!

## 2021-02-17 ENCOUNTER — HOSPITAL ENCOUNTER (OUTPATIENT)
Dept: MAMMOGRAPHY | Age: 76
Discharge: HOME | End: 2021-02-17
Attending: FAMILY MEDICINE
Payer: MEDICARE

## 2021-02-17 DIAGNOSIS — Z12.31 SCREENING MAMMOGRAM FOR HIGH-RISK PATIENT: ICD-10-CM

## 2021-02-17 DIAGNOSIS — R92.8 ABNORMAL MAMMOGRAM OF RIGHT BREAST: Primary | ICD-10-CM

## 2021-02-17 PROCEDURE — 77063 BREAST TOMOSYNTHESIS BI: CPT

## 2021-02-17 NOTE — PROGRESS NOTES
There is a small low-density mass in the right breast and they recommend a special x-ray to determine that specific density. I will order that.   The left breast is normal.

## 2021-02-17 NOTE — PROGRESS NOTES
Mammogram        Identifying Data:  70 y.o. , Fredrick Vargas , female     Historical Data Reviewed ; Allergies-  No Known Allergies  Current medication as of last visit and reconciliation-  Current Outpatient Medications on File Prior to Visit   Medication Sig Dispense Refill    pantoprazole (PROTONIX) 40 mg tablet TAKE 1 TABLET BY MOUTH EVERY DAY 90 Tab 3    diclofenac EC (VOLTAREN) 50 mg EC tablet TAKE 1 TABLET BY MOUTH TWICE A  Tab 4    ibandronate (BONIVA) 150 mg tablet TAKE 1 TABLET BY MOUTH ONCE A MONTH 3 Tab 6    atorvastatin (LIPITOR) 10 mg tablet TAKE 1 TABLET BY MOUTH EVERY DAY 90 Tab 4    atenoloL (TENORMIN) 50 mg tablet Take  by mouth daily.  baclofen (LIORESAL) 10 mg tablet Take  by mouth three (3) times daily.  calcium citrate 200 mg (950 mg) tablet Take  by mouth two (2) times a day.  diclofenac potassium (CATAFLAM) 50 mg tablet Take 50 mg by mouth two (2) times a day.  gabapentin (NEURONTIN) 300 mg capsule Take 300 mg by mouth three (3) times daily.  nicotinic acid (NIACIN) 500 mg tablet Take 500 mg by mouth two (2) times daily (with meals).  rOPINIRole (REQUIP) 2 mg tablet Take  by mouth nightly.  vitamin E (AQUA GEMS) 400 unit capsule Take  by mouth daily.  rivaroxaban (Xarelto) 10 mg tablet Take  by mouth daily. No current facility-administered medications on file prior to visit. Past Medical History-  Patient Active Problem List   Diagnosis Code    Arthritis M19.90    Atrial fibrillation ([facility] [address] I48.91    Back ache M54.9    Disorder of bone and articular cartilage M89.9, M94.9    GERD (gastroesophageal reflux disease) K21.9    Pure hypercholesterolemia E78.00    Vitamin D deficiency E55.9    Restless leg syndrome G25.81       Assessment and Plan:    ICD-10-CM ICD-9-CM    1.  Abnormal mammogram of right breast  R92.8 793.80 CHARU MAMMO RT DX INCL CAD           Barney Layne, DO

## 2021-02-23 ENCOUNTER — HOSPITAL ENCOUNTER (OUTPATIENT)
Dept: MAMMOGRAPHY | Age: 76
Discharge: HOME | End: 2021-02-23
Attending: FAMILY MEDICINE
Payer: MEDICARE

## 2021-02-23 ENCOUNTER — TELEPHONE (OUTPATIENT)
Dept: FAMILY MEDICINE CLINIC | Age: 76
End: 2021-02-23

## 2021-02-23 DIAGNOSIS — R92.8 ABNORMAL MAMMOGRAM OF RIGHT BREAST: Primary | ICD-10-CM

## 2021-02-23 DIAGNOSIS — R92.8 ABNORMAL MAMMOGRAM: ICD-10-CM

## 2021-02-23 DIAGNOSIS — R92.8 ABNORMAL MAMMOGRAM OF RIGHT BREAST: ICD-10-CM

## 2021-02-23 PROCEDURE — 76642 ULTRASOUND BREAST LIMITED: CPT

## 2021-02-23 NOTE — PROGRESS NOTES
Pt notified of results. States that she had an US done today. The Dr came in and told her it looked like a cyst with elongated milk glands. To fu in a year.

## 2021-02-23 NOTE — TELEPHONE ENCOUNTER
Imaging department called and spoke to MA in the office requesting a US order for the Right breast due to an abnormal mammogram.

## 2021-02-26 ENCOUNTER — TRANSCRIBE ORDER (OUTPATIENT)
Dept: SCHEDULING | Age: 76
End: 2021-02-26

## 2021-02-26 DIAGNOSIS — R92.8 ABNORMAL MAMMOGRAM: Primary | ICD-10-CM

## 2021-08-16 RX ORDER — ROPINIROLE 2 MG/1
TABLET, FILM COATED ORAL
Qty: 90 TABLET | Refills: 4 | Status: SHIPPED | OUTPATIENT
Start: 2021-08-16 | End: 2021-11-26 | Stop reason: SDUPTHER

## 2021-08-16 RX ORDER — ATORVASTATIN CALCIUM 10 MG/1
TABLET, FILM COATED ORAL
Qty: 90 TABLET | Refills: 4 | Status: SHIPPED | OUTPATIENT
Start: 2021-08-16 | End: 2021-11-26 | Stop reason: SDUPTHER

## 2021-08-18 ENCOUNTER — OFFICE VISIT (OUTPATIENT)
Dept: FAMILY MEDICINE CLINIC | Age: 76
End: 2021-08-18
Payer: MEDICARE

## 2021-08-18 VITALS
HEART RATE: 73 BPM | WEIGHT: 181 LBS | HEIGHT: 66 IN | DIASTOLIC BLOOD PRESSURE: 73 MMHG | OXYGEN SATURATION: 98 % | TEMPERATURE: 97.5 F | SYSTOLIC BLOOD PRESSURE: 132 MMHG | BODY MASS INDEX: 29.09 KG/M2

## 2021-08-18 DIAGNOSIS — I48.91 ATRIAL FIBRILLATION, UNSPECIFIED TYPE (HCC): ICD-10-CM

## 2021-08-18 DIAGNOSIS — E78.2 MIXED HYPERLIPIDEMIA: ICD-10-CM

## 2021-08-18 DIAGNOSIS — M19.90 ARTHRITIS: ICD-10-CM

## 2021-08-18 DIAGNOSIS — M81.0 OSTEOPOROSIS, UNSPECIFIED OSTEOPOROSIS TYPE, UNSPECIFIED PATHOLOGICAL FRACTURE PRESENCE: ICD-10-CM

## 2021-08-18 DIAGNOSIS — G89.29 CHRONIC BILATERAL LOW BACK PAIN, UNSPECIFIED WHETHER SCIATICA PRESENT: Primary | ICD-10-CM

## 2021-08-18 DIAGNOSIS — Z00.00 MEDICARE ANNUAL WELLNESS VISIT, SUBSEQUENT: ICD-10-CM

## 2021-08-18 DIAGNOSIS — Z13.39 SCREENING FOR ALCOHOLISM: ICD-10-CM

## 2021-08-18 DIAGNOSIS — G25.81 RESTLESS LEG SYNDROME: ICD-10-CM

## 2021-08-18 DIAGNOSIS — M54.50 CHRONIC BILATERAL LOW BACK PAIN, UNSPECIFIED WHETHER SCIATICA PRESENT: Primary | ICD-10-CM

## 2021-08-18 DIAGNOSIS — Z13.31 SCREENING FOR DEPRESSION: ICD-10-CM

## 2021-08-18 PROCEDURE — 3017F COLORECTAL CA SCREEN DOC REV: CPT | Performed by: FAMILY MEDICINE

## 2021-08-18 PROCEDURE — G8536 NO DOC ELDER MAL SCRN: HCPCS | Performed by: FAMILY MEDICINE

## 2021-08-18 PROCEDURE — 1101F PT FALLS ASSESS-DOCD LE1/YR: CPT | Performed by: FAMILY MEDICINE

## 2021-08-18 PROCEDURE — G8419 CALC BMI OUT NRM PARAM NOF/U: HCPCS | Performed by: FAMILY MEDICINE

## 2021-08-18 PROCEDURE — G8510 SCR DEP NEG, NO PLAN REQD: HCPCS | Performed by: FAMILY MEDICINE

## 2021-08-18 PROCEDURE — G8427 DOCREV CUR MEDS BY ELIG CLIN: HCPCS | Performed by: FAMILY MEDICINE

## 2021-08-18 PROCEDURE — G0439 PPPS, SUBSEQ VISIT: HCPCS | Performed by: FAMILY MEDICINE

## 2021-08-18 PROCEDURE — G9899 SCRN MAM PERF RSLTS DOC: HCPCS | Performed by: FAMILY MEDICINE

## 2021-08-18 RX ORDER — ASPIRIN 81 MG/1
81 TABLET ORAL DAILY
COMMUNITY
End: 2022-04-01 | Stop reason: ALTCHOICE

## 2021-08-18 NOTE — PROGRESS NOTES
This is the Subsequent Medicare Annual Wellness Exam, performed 12 months or more after the Initial AWV or the last Subsequent AWV    I have reviewed the patient's medical history in detail and updated the computerized patient record. Assessment/Plan   Education and counseling provided:  Are appropriate based on today's review and evaluation    1. Medicare annual wellness visit, subsequent  2. Screening for alcoholism  -     MD ANNUAL ALCOHOL SCREEN 15 MIN  3. Screening for depression  -     DEPRESSION SCREEN ANNUAL       Depression Risk Factor Screening     3 most recent PHQ Screens 2/11/2021   Little interest or pleasure in doing things Not at all   Feeling down, depressed, irritable, or hopeless Not at all   Total Score PHQ 2 0       Alcohol Risk Screen    Do you average more than 1 drink per night or more than 7 drinks a week:  No    On any one occasion in the past three months have you have had more than 3 drinks containing alcohol:  No        Functional Ability and Level of Safety    Hearing: Hearing is good. The patient wears hearing aids. Activities of Daily Living: The home contains: handrails and grab bars  Patient does total self care      Ambulation: with no difficulty     Fall Risk:  Fall Risk Assessment, last 12 mths 8/12/2020   Able to walk? Yes   Fall in past 12 months?  No      Abuse Screen:  Patient is not abused       Cognitive Screening    Has your family/caregiver stated any concerns about your memory: no     Cognitive Screening: Normal - MMSE (Mini Mental Status Exam), Clock Drawing Test    Health Maintenance Due     Health Maintenance Due   Topic Date Due    Hepatitis C Screening  Never done    COVID-19 Vaccine (1) Never done    Colorectal Cancer Screening Combo  Never done    Shingrix Vaccine Age 50> (1 of 2) Never done    DTaP/Tdap/Td series (1 - Tdap) 01/02/2008    Lipid Screen  08/12/2021       Patient Care Team   Patient Care Team:  Joseluis Barfield DO as PCP - General (Family Medicine)  Flash Lgiht DO as PCP - [address] Provider    History     Patient Active Problem List   Diagnosis Code    Arthritis M19.90    Atrial fibrillation ([address] I48.91    Back ache M54.9    Disorder of bone and articular cartilage M89.9, M94.9    GERD (gastroesophageal reflux disease) K21.9    Pure hypercholesterolemia E78.00    Vitamin D deficiency E55.9    Restless leg syndrome G25.81     Past Medical History:   Diagnosis Date    Arthritis 7/30/2020    Atrial fibrillation ([address] 7/30/2020    Back ache 7/30/2020    Disorder of bone and articular cartilage 7/30/2020    GERD (gastroesophageal reflux disease) 7/30/2020    Pure hypercholesterolemia 7/30/2020    Vitamin D deficiency 7/30/2020      Past Surgical History:   Procedure Laterality Date    HX CATARACT REMOVAL  11/2012    HX COLONOSCOPY  01/26/2012    HX ORTHOPAEDIC  12/01/2018    hernated disc repair    HX ORTHOPAEDIC  02/25/2020    lumbar surgery-L2-5 cage fusion    HX OTHER SURGICAL Bilateral 08/2017    Bilateral Laser Treatment    HX OTHER SURGICAL  01/2021    gum surgery    HX TUBAL LIGATION  1981    [place] CARDIAC SURG PROCEDURE UNLIST  2014    a-fib procedure     Current Outpatient Medications   Medication Sig Dispense Refill    atorvastatin (LIPITOR) 10 mg tablet TAKE 1 TABLET BY MOUTH EVERY DAY 90 Tablet 4    rOPINIRole (REQUIP) 2 mg tablet TAKE 1 TABLET BY MOUTH EVERY DAY 90 Tablet 4    pantoprazole (PROTONIX) 40 mg tablet TAKE 1 TABLET BY MOUTH EVERY DAY 90 Tab 3    diclofenac EC (VOLTAREN) 50 mg EC tablet TAKE 1 TABLET BY MOUTH TWICE A  Tab 4    ibandronate (BONIVA) 150 mg tablet TAKE 1 TABLET BY MOUTH ONCE A MONTH 3 Tab 6    atenoloL (TENORMIN) 50 mg tablet Take  by mouth daily.  baclofen (LIORESAL) 10 mg tablet Take  by mouth three (3) times daily.  calcium citrate 200 mg (950 mg) tablet Take  by mouth two (2) times a day.       diclofenac potassium (CATAFLAM) 50 mg tablet Take 50 mg by mouth two (2) times a day.  gabapentin (NEURONTIN) 300 mg capsule Take 300 mg by mouth three (3) times daily.  nicotinic acid (NIACIN) 500 mg tablet Take 500 mg by mouth two (2) times daily (with meals).  vitamin E (AQUA GEMS) 400 unit capsule Take  by mouth daily.  rivaroxaban (Xarelto) 10 mg tablet Take  by mouth daily.        No Known Allergies    Family History   Problem Relation Age of Onset    Cancer Mother         breast    Breast Cancer Mother     Heart Disease Father     Cancer Brother     Lung Cancer Other     Lung Cancer Other     Lung Cancer Other     Breast Cancer Sister      Social History     Tobacco Use    Smoking status: Never Smoker    Smokeless tobacco: Never Used   Substance Use Topics    Alcohol use: Not Currently         Carlos Soriano CMA

## 2021-08-18 NOTE — PROGRESS NOTES
IDENTIFYING INFORMATION:      Hawa Carlisle , 70 y.o., female  [address] [place] [address]     Medical Record Number: [id number]          CHIEF COMPLAINT:     Chief Complaint   Patient presents with    Annual Wellness Visit         HISTORY OF PRESENT ILLNESS:    Monet Anthony is a 70 y.o. female  has a past medical history of Arthritis (7/30/2020), Atrial fibrillation ([address] (7/30/2020), Back ache (7/30/2020), Disorder of bone and articular cartilage (7/30/2020), GERD (gastroesophageal reflux disease) (7/30/2020), Pure hypercholesterolemia (7/30/2020), and Vitamin D deficiency (7/30/2020). .  she comes in for follow up and is concerned about what kind of arthritis she has. Has hand and back pain, wakes up at night also, has had lower lumbar surgery before. It is relieved with tylenol. Has hand pain and back pain with stiffness, getting worse, fingers \"getting twisted\". Has afib and on xarelto. Sees cardiologist routinely. Monet Anthony is also due for annual wellness visit. The patient realizes that this may be a separate charge and may receive and be responsible for a bill for office visit and wellness visit this day. Items addressed in wellness and office visit are included in the assessment and plan of this note. The patient agrees to proceed. PAST MEDICAL HISTORY:     Past Medical History:   Diagnosis Date    Arthritis 7/30/2020    Atrial fibrillation ([address] 7/30/2020    Back ache 7/30/2020    Disorder of bone and articular cartilage 7/30/2020    GERD (gastroesophageal reflux disease) 7/30/2020    Pure hypercholesterolemia 7/30/2020    Vitamin D deficiency 7/30/2020       MEDICATIONS:     Current Outpatient Medications on File Prior to Visit   Medication Sig Dispense Refill    aspirin delayed-release 81 mg tablet Take 81 mg by mouth daily.       atorvastatin (LIPITOR) 10 mg tablet TAKE 1 TABLET BY MOUTH EVERY DAY 90 Tablet 4    rOPINIRole (REQUIP) 2 mg tablet TAKE 1 TABLET BY MOUTH EVERY DAY 90 Tablet 4    pantoprazole (PROTONIX) 40 mg tablet TAKE 1 TABLET BY MOUTH EVERY DAY 90 Tab 3    diclofenac EC (VOLTAREN) 50 mg EC tablet TAKE 1 TABLET BY MOUTH TWICE A  Tab 4    ibandronate (BONIVA) 150 mg tablet TAKE 1 TABLET BY MOUTH ONCE A MONTH 3 Tab 6    atenoloL (TENORMIN) 50 mg tablet Take  by mouth daily.  calcium citrate 200 mg (950 mg) tablet Take  by mouth two (2) times a day.  nicotinic acid (NIACIN) 500 mg tablet Take 500 mg by mouth two (2) times daily (with meals).  vitamin E (AQUA GEMS) 400 unit capsule Take  by mouth daily.  rivaroxaban (Xarelto) 10 mg tablet Take  by mouth daily.  [DISCONTINUED] baclofen (LIORESAL) 10 mg tablet Take  by mouth three (3) times daily. (Patient not taking: Reported on 8/18/2021)      [DISCONTINUED] diclofenac potassium (CATAFLAM) 50 mg tablet Take 50 mg by mouth two (2) times a day. (Patient not taking: Reported on 8/18/2021)      [DISCONTINUED] gabapentin (NEURONTIN) 300 mg capsule Take 300 mg by mouth three (3) times daily. (Patient not taking: Reported on 8/18/2021)       No current facility-administered medications on file prior to visit.        ALLERGIES:    No Known Allergies      SOCIAL HISTORY:     Social History     Tobacco Use    Smoking status: Never Smoker    Smokeless tobacco: Never Used   Substance Use Topics    Alcohol use: Not Currently    Drug use: Never       SURGICAL HISTORY:    Past Surgical History:   Procedure Laterality Date    HX CATARACT REMOVAL  11/2012    HX COLONOSCOPY  01/26/2012    HX ORTHOPAEDIC  12/01/2018    hernated disc repair    HX ORTHOPAEDIC  02/25/2020    lumbar surgery-L2-5 cage fusion    HX OTHER SURGICAL Bilateral 08/2017    Bilateral Laser Treatment    HX OTHER SURGICAL  01/2021    gum surgery    HX TUBAL LIGATION  1981    [place] CARDIAC SURG PROCEDURE UNLIST  2014    a-fib procedure        FAMILY HISTORY:    Family History   Problem Relation Age of Onset    Cancer Mother         breast    Breast Cancer Mother     Heart Disease Father     Cancer Brother     Lung Cancer Other     Lung Cancer Other     Lung Cancer Other     Breast Cancer Sister          REVIEW OF SYSTEMS:    I personally collected this information from all available source present (patient/others in room and records available) -[email or web address]    Review of Systems   Constitutional: Negative for chills, diaphoresis, fever and weight loss. HENT: Positive for hearing loss (uses hearing aids). Negative for congestion, nosebleeds, sinus pain and sore throat. Eyes: Negative for blurred vision, double vision and photophobia. Respiratory: Negative for cough, sputum production and shortness of breath. Cardiovascular: Negative for chest pain, palpitations, orthopnea, claudication, leg swelling and PND. Gastrointestinal: Negative for abdominal pain, blood in stool, constipation, diarrhea, heartburn, melena, nausea and vomiting. Genitourinary: Negative for dysuria, frequency and urgency. Musculoskeletal: Positive for joint pain. Negative for back pain, falls, myalgias and neck pain. Skin: Negative for itching and rash. Neurological: Negative for dizziness, tingling, sensory change, focal weakness and headaches. Psychiatric/Behavioral: Negative for depression. The patient is not nervous/anxious and does not have insomnia. PHYSICAL EXAMINATION:    Vital Signs:    Visit Vitals  /73 (BP 1 Location: Left upper arm, BP Patient Position: Sitting)   Pulse 73   Temp 97.5 °F (36.4 °C) (Temporal)   Ht 5' 6\" (1.676 m)   Wt 181 lb (82.1 kg)   SpO2 98%   BMI 29.21 kg/m²         Wt Readings from Last 3 Encounters:   08/18/21 181 lb (82.1 kg)   02/11/21 182 lb (82.6 kg)   10/21/20 178 lb (80.7 kg)     BP Readings from Last 3 Encounters:   08/18/21 132/73   02/11/21 131/72   10/21/20 131/79         Physical Exam  Nursing note reviewed.    Constitutional:       General: She is not in acute distress. Appearance: Normal appearance. She is obese. She is not ill-appearing or toxic-appearing. HENT:      Right Ear: Tympanic membrane, ear canal and external ear normal.      Left Ear: Tympanic membrane, ear canal and external ear normal.      Nose: No congestion or rhinorrhea. Mouth/Throat:      Pharynx: No oropharyngeal exudate or posterior oropharyngeal erythema. Eyes:      General: No scleral icterus. Extraocular Movements: Extraocular movements intact. Conjunctiva/sclera: Conjunctivae normal.      Pupils: Pupils are equal, round, and reactive to light. Neck:      Vascular: No carotid bruit. Cardiovascular:      Rate and Rhythm: Normal rate and regular rhythm. Pulses: Normal pulses. Heart sounds: No murmur heard. No friction rub. No gallop. Pulmonary:      Effort: Pulmonary effort is normal.      Breath sounds: Normal breath sounds. No wheezing, rhonchi or rales. Abdominal:      General: Bowel sounds are normal. There is no distension. Palpations: Abdomen is soft. There is no mass. Tenderness: There is no abdominal tenderness. Musculoskeletal:         General: No swelling, tenderness or deformity. Cervical back: No rigidity. No muscular tenderness. Right lower leg: No edema. Left lower leg: No edema. Comments: Varicosities without edema  Heberdens nodes bilaterally-several fingers DIP  Some other mild deviation of hands/fingers bilaterally  Tight from L3-4-5 bilaterally into SI    Lymphadenopathy:      Cervical: No cervical adenopathy. Skin:     Coloration: Skin is not jaundiced. Findings: No erythema or rash. Neurological:      General: No focal deficit present. Mental Status: She is alert and oriented to person, place, and time. Mental status is at baseline. Psychiatric:         Mood and Affect: Mood normal.         Behavior: Behavior normal.         Thought Content:  Thought content normal.         Judgment: Judgment normal.       Three Word Registration and RECALL:      3/3  Clock Drawin/2  Total:             ASSESSMENT/PLAN:      Discussion (regarding today's visit with Winifred Landeros); ANNUAL WELLNESS VISIT PERFORMED:     Nursing staff wellness visit note reviewed. Advanced directives were discussed with the patient and information was given if appropriate. Tobacco use, alcohol screening, weight and body mass index, level of physical activity, nutrition, fall risk screenings were done. We also reviewed and discussed vaccinations. Those were ordered if indicated and patient requested. We discussed mammography, Pap smear and pelvic exam as well as bone density testing. Those were ordered if indicated. We discussed colon cancer screening, eye exam, depression screening, mental status/cognition and pain levels. Those items addressed with the patient were ordered this visit if indicated. Mentation is in medical nursing staff note and my office visit. Reviewed all information as noted.  Depression and alcohol screen are negative. Total time less than 3 minutes each. ICD-10-CM ICD-9-CM    1. Chronic bilateral low back pain, unspecified whether sciatica present  M54.5 724.2     G89.29 338.29    2. Medicare annual wellness visit, subsequent  Z00.00 V70.0    3. Screening for alcoholism  Z13.39 V79.1 GA ANNUAL ALCOHOL SCREEN 15 MIN   4. Screening for depression  Z13.31 V79.0 DEPRESSION SCREEN ANNUAL   5. Arthritis  M19.90 716.90 CBC WITH AUTOMATED DIFF      RHEUMATOID FACTOR, QL      JAYLA BY MULTIPLEX FLOW IA, QL      SED RATE (ESR)   6. Restless leg syndrome  G25.81 333.94    7. Atrial fibrillation, unspecified type ([facility] [address] [facility]  I48.91 427.31 CBC WITH AUTOMATED DIFF      METABOLIC PANEL, COMPREHENSIVE      TSH 3RD GENERATION      LIPID PANEL      URINALYSIS W/ RFLX MICROSCOPIC   8.  Osteoporosis, unspecified osteoporosis type, unspecified pathological fracture presence  M81.0 733.00 DEXA BONE DENSITY STUDY AXIAL      VITAMIN D, 25 HYDROXY   9. Mixed hyperlipidemia  E78.2 272.2 LIPID PANEL      WE reviewed each diagnosis listed for today's visit.  WE reviewed medications, treatment, testing such as labs, imagine, referrals and when to call regarding results and appointments.  Reminded patient to keep any and all appointments with specialists, labs, imaging.  Reminded patient to make sure we get copies of any specialists care, labs and imaging.  Reminded patient to call of come by the office if there are any concerns, questions , comments or problems.  The patient verbalized understanding of the care plan and all questions were answered to the patient's satisfaction prior to leaving the office.  The patient was told that failure to comply with recommended testing could result in abnormal health consequences.  The patient was instructed to have yearly routine health maintenance including but not limited to age appropriate vaccines, testing, screening exams.  ALL questions were answered to her satisfaction before leaving the office. The patient actively participated in medical decision making. FOLLOW UP:     Patient knows to keep any and all future visits scheduled unless told otherwise. Patient knows to call, come back if any concerns, questions, comments or problems arise. Follow-up and Dispositions    · Return in about 1 year (around 8/18/2022) for follow up yearly, arthritis, medicare wellness. Radha Dong DO    This visit was reviewed and signed electronically. It was been completed with voice recognition software and hand typing. It may have syntax and spelling errors despite editing.

## 2021-08-18 NOTE — PATIENT INSTRUCTIONS
Medicare Wellness Visit, Female     The best way to live healthy is to have a lifestyle where you eat a well-balanced diet, exercise regularly, limit alcohol use, and quit all forms of tobacco/nicotine, if applicable. Regular preventive services are another way to keep healthy. Preventive services (vaccines, screening tests, monitoring & exams) can help personalize your care plan, which helps you manage your own care. Screening tests can find health problems at the earliest stages, when they are easiest to treat. [facility] follows the current, evidence-based guidelines published by the [facility] Darshan Alfred ([facility]) when recommending preventive services for our patients. Because we follow these guidelines, sometimes recommendations change over time as research supports it. (For example, mammograms used to be recommended annually. Even though Medicare will still pay for an annual mammogram, the newer guidelines recommend a mammogram every two years for women of average risk). Of course, you and your doctor may decide to screen more often for some diseases, based on your risk and your co-morbidities (chronic disease you are already diagnosed with). Preventive services for you include:  - Medicare offers their members a free annual wellness visit, which is time for you and your primary care provider to discuss and plan for your preventive service needs. Take advantage of this benefit every year!  -All adults over the age of 72 should receive the recommended pneumonia vaccines. Current USPSTF guidelines recommend a series of two vaccines for the best pneumonia protection.   -All adults should have a flu vaccine yearly and a tetanus vaccine every 10 years.   -All adults age 48 and older should receive the shingles vaccines (series of two vaccines).       -All adults age 38-68 who are overweight should have a diabetes screening test once every three years.   -All adults born between [birth year] and 1965 should be screened once for Hepatitis C.  -Other screening tests and preventive services for persons with diabetes include: an eye exam to screen for diabetic retinopathy, a kidney function test, a foot exam, and stricter control over your cholesterol.   -Cardiovascular screening for adults with routine risk involves an electrocardiogram (ECG) at intervals determined by your doctor.   -Colorectal cancer screenings should be done for adults age 54-65 with no increased risk factors for colorectal cancer. There are a number of acceptable methods of screening for this type of cancer. Each test has its own benefits and drawbacks. Discuss with your doctor what is most appropriate for you during your annual wellness visit. The different tests include: colonoscopy (considered the best screening method), a fecal occult blood test, a fecal DNA test, and sigmoidoscopy.    -A bone mass density test is recommended when a woman turns 65 to screen for osteoporosis. This test is only recommended one time, as a screening. Some providers will use this same test as a disease monitoring tool if you already have osteoporosis. -Breast cancer screenings are recommended every other year for women of normal risk, age 54-69.  -Cervical cancer screenings for women over age 72 are only recommended with certain risk factors.      Here is a list of your current Health Maintenance items (your personalized list of preventive services) with a due date:  Health Maintenance Due   Topic Date Due    Hepatitis C Test  Never done    COVID-19 Vaccine (1) Never done    Colorectal Screening  Never done    Shingles Vaccine (1 of 2) Never done    DTaP/Tdap/Td  (1 - Tdap) 01/02/2008    Cholesterol Test   08/12/2021         Medicare Wellness Visit, Female     The best way to live healthy is to have a lifestyle where you eat a well-balanced diet, exercise regularly, limit alcohol use, and quit all forms of tobacco/nicotine, if applicable. Regular preventive services are another way to keep healthy. Preventive services (vaccines, screening tests, monitoring & exams) can help personalize your care plan, which helps you manage your own care. Screening tests can find health problems at the earliest stages, when they are easiest to treat. [facility] follows the current, evidence-based guidelines published by the [facility] Darshan Alfred ([facility]) when recommending preventive services for our patients. Because we follow these guidelines, sometimes recommendations change over time as research supports it. (For example, mammograms used to be recommended annually. Even though Medicare will still pay for an annual mammogram, the newer guidelines recommend a mammogram every two years for women of average risk). Of course, you and your doctor may decide to screen more often for some diseases, based on your risk and your co-morbidities (chronic disease you are already diagnosed with). Preventive services for you include:  - Medicare offers their members a free annual wellness visit, which is time for you and your primary care provider to discuss and plan for your preventive service needs. Take advantage of this benefit every year!  -All adults over the age of 72 should receive the recommended pneumonia vaccines. Current USPSTF guidelines recommend a series of two vaccines for the best pneumonia protection.   -All adults should have a flu vaccine yearly and a tetanus vaccine every 10 years.   -All adults age 48 and older should receive the shingles vaccines (series of two vaccines).       -All adults age 38-68 who are overweight should have a diabetes screening test once every three years.   -All adults born between 80 and 1965 should be screened once for Hepatitis C.  -Other screening tests and preventive services for persons with diabetes include: an eye exam to screen for diabetic retinopathy, a kidney function test, a foot exam, and stricter control over your cholesterol.   -Cardiovascular screening for adults with routine risk involves an electrocardiogram (ECG) at intervals determined by your doctor.   -Colorectal cancer screenings should be done for adults age 54-65 with no increased risk factors for colorectal cancer. There are a number of acceptable methods of screening for this type of cancer. Each test has its own benefits and drawbacks. Discuss with your doctor what is most appropriate for you during your annual wellness visit. The different tests include: colonoscopy (considered the best screening method), a fecal occult blood test, a fecal DNA test, and sigmoidoscopy.    -A bone mass density test is recommended when a woman turns 65 to screen for osteoporosis. This test is only recommended one time, as a screening. Some providers will use this same test as a disease monitoring tool if you already have osteoporosis. -Breast cancer screenings are recommended every other year for women of normal risk, age 54-69.  -Cervical cancer screenings for women over age 72 are only recommended with certain risk factors.      Here is a list of your current Health Maintenance items (your personalized list of preventive services) with a due date:  Health Maintenance Due   Topic Date Due    Hepatitis C Test  Never done    Colorectal Screening  Never done    Shingles Vaccine (1 of 2) Never done    DTaP/Tdap/Td  (1 - Tdap) 01/02/2008    Cholesterol Test   08/12/2021

## 2021-08-19 LAB
25(OH)D3+25(OH)D2 SERPL-MCNC: 29.3 NG/ML (ref 30–100)
ALBUMIN SERPL-MCNC: 4.4 G/DL (ref 3.7–4.7)
ALBUMIN/GLOB SERPL: 1.5 {RATIO} (ref 1.2–2.2)
ALP SERPL-CCNC: 42 IU/L (ref 48–121)
ALT SERPL-CCNC: 20 IU/L (ref 0–32)
ANA SER QL: POSITIVE
APPEARANCE UR: ABNORMAL
AST SERPL-CCNC: 26 IU/L (ref 0–40)
BACTERIA #/AREA URNS HPF: ABNORMAL /[HPF]
BASOPHILS # BLD AUTO: 0 X10E3/UL (ref 0–0.2)
BASOPHILS NFR BLD AUTO: 1 %
BILIRUB SERPL-MCNC: 0.6 MG/DL (ref 0–1.2)
BILIRUB UR QL STRIP: NEGATIVE
BUN SERPL-MCNC: 11 MG/DL (ref 8–27)
BUN/CREAT SERPL: 14 (ref 12–28)
CALCIUM SERPL-MCNC: 8.8 MG/DL (ref 8.7–10.3)
CASTS URNS QL MICRO: ABNORMAL /LPF
CHLORIDE SERPL-SCNC: 109 MMOL/L (ref 96–106)
CHOLEST SERPL-MCNC: 187 MG/DL (ref 100–199)
CO2 SERPL-SCNC: 23 MMOL/L (ref 20–29)
COLOR UR: YELLOW
CREAT SERPL-MCNC: 0.76 MG/DL (ref 0.57–1)
EOSINOPHIL # BLD AUTO: 0.2 X10E3/UL (ref 0–0.4)
EOSINOPHIL NFR BLD AUTO: 3 %
EPI CELLS #/AREA URNS HPF: ABNORMAL /HPF (ref 0–10)
ERYTHROCYTE [DISTWIDTH] IN BLOOD BY AUTOMATED COUNT: 14.3 % (ref 11.7–15.4)
ERYTHROCYTE [SEDIMENTATION RATE] IN BLOOD BY WESTERGREN METHOD: 10 MM/HR (ref 0–40)
GLOBULIN SER CALC-MCNC: 2.9 G/DL (ref 1.5–4.5)
GLUCOSE SERPL-MCNC: 89 MG/DL (ref 65–99)
GLUCOSE UR QL: NEGATIVE
HCT VFR BLD AUTO: 37 % (ref 34–46.6)
HDLC SERPL-MCNC: 49 MG/DL
HGB BLD-MCNC: 11.6 G/DL (ref 11.1–15.9)
HGB UR QL STRIP: NEGATIVE
IMM GRANULOCYTES # BLD AUTO: 0 X10E3/UL (ref 0–0.1)
IMM GRANULOCYTES NFR BLD AUTO: 0 %
KETONES UR QL STRIP: NEGATIVE
LDLC SERPL CALC-MCNC: 110 MG/DL (ref 0–99)
LEUKOCYTE ESTERASE UR QL STRIP: ABNORMAL
LYMPHOCYTES # BLD AUTO: 1.8 X10E3/UL (ref 0.7–3.1)
LYMPHOCYTES NFR BLD AUTO: 31 %
MCH RBC QN AUTO: 28.7 PG (ref 26.6–33)
MCHC RBC AUTO-ENTMCNC: 31.4 G/DL (ref 31.5–35.7)
MCV RBC AUTO: 92 FL (ref 79–97)
MICRO URNS: ABNORMAL
MONOCYTES # BLD AUTO: 0.6 X10E3/UL (ref 0.1–0.9)
MONOCYTES NFR BLD AUTO: 10 %
NEUTROPHILS # BLD AUTO: 3.2 X10E3/UL (ref 1.4–7)
NEUTROPHILS NFR BLD AUTO: 55 %
NITRITE UR QL STRIP: NEGATIVE
PH UR STRIP: 6 [PH] (ref 5–7.5)
PLATELET # BLD AUTO: 227 X10E3/UL (ref 150–450)
POTASSIUM SERPL-SCNC: 4.2 MMOL/L (ref 3.5–5.2)
PROT SERPL-MCNC: 7.3 G/DL (ref 6–8.5)
PROT UR QL STRIP: NEGATIVE
RBC # BLD AUTO: 4.04 X10E6/UL (ref 3.77–5.28)
RBC #/AREA URNS HPF: ABNORMAL /HPF (ref 0–2)
RHEUMATOID FACT SERPL-ACNC: <10 IU/ML (ref 0–13.9)
SODIUM SERPL-SCNC: 148 MMOL/L (ref 134–144)
SP GR UR: 1.02 (ref 1–1.03)
TRIGL SERPL-MCNC: 159 MG/DL (ref 0–149)
TSH SERPL DL<=0.005 MIU/L-ACNC: 1.56 UIU/ML (ref 0.45–4.5)
UROBILINOGEN UR STRIP-MCNC: 0.2 MG/DL (ref 0.2–1)
VLDLC SERPL CALC-MCNC: 28 MG/DL (ref 5–40)
WBC # BLD AUTO: 5.9 X10E3/UL (ref 3.4–10.8)
WBC #/AREA URNS HPF: ABNORMAL /HPF (ref 0–5)

## 2021-08-23 NOTE — PROGRESS NOTES
The blood count is normal without anemia or infection. The liver, kidneys, sugar are normal.  Thyroid is normal.  Cholesterol is just up a little but not severe does not need medicine but continue to watch the diet. Urinalysis has a little bit of leukocyte esterase we could recheck if she is having problems or we will follow-up does not seem to be affected infection. The rheumatoid and sed rate are negative the JAYLA is positive which is not specific. We can repeat that in a couple weeks.

## 2021-11-18 ENCOUNTER — HOSPITAL ENCOUNTER (OUTPATIENT)
Dept: MAMMOGRAPHY | Age: 76
Discharge: HOME | End: 2021-11-18
Attending: FAMILY MEDICINE
Payer: MEDICARE

## 2021-11-18 DIAGNOSIS — M81.0 OSTEOPOROSIS, UNSPECIFIED OSTEOPOROSIS TYPE, UNSPECIFIED PATHOLOGICAL FRACTURE PRESENCE: ICD-10-CM

## 2021-11-18 PROCEDURE — 77080 DXA BONE DENSITY AXIAL: CPT

## 2021-11-22 NOTE — PROGRESS NOTES
The parameters are indicated in his not osteoporosis presently. However, I would stay on them and even recheck in 2 to 3 years.

## 2021-11-26 DIAGNOSIS — K21.9 GASTROESOPHAGEAL REFLUX DISEASE WITHOUT ESOPHAGITIS: Primary | ICD-10-CM

## 2021-11-26 DIAGNOSIS — E78.00 PURE HYPERCHOLESTEROLEMIA: ICD-10-CM

## 2021-11-29 RX ORDER — ATORVASTATIN CALCIUM 10 MG/1
10 TABLET, FILM COATED ORAL DAILY
Qty: 90 TABLET | Refills: 4 | Status: SHIPPED | OUTPATIENT
Start: 2021-11-29 | End: 2022-04-05

## 2021-11-29 RX ORDER — DICLOFENAC SODIUM 50 MG/1
50 TABLET, DELAYED RELEASE ORAL 2 TIMES DAILY
Qty: 180 TABLET | Refills: 4 | Status: SHIPPED | OUTPATIENT
Start: 2021-11-29

## 2021-11-29 RX ORDER — IBANDRONATE SODIUM 150 MG/1
TABLET, FILM COATED ORAL
Qty: 3 TABLET | Refills: 4 | Status: SHIPPED | OUTPATIENT
Start: 2021-11-29 | End: 2022-03-02 | Stop reason: SDUPTHER

## 2021-11-29 RX ORDER — ROPINIROLE 2 MG/1
2 TABLET, FILM COATED ORAL DAILY
Qty: 90 TABLET | Refills: 4 | Status: SHIPPED | OUTPATIENT
Start: 2021-11-29 | End: 2022-10-11 | Stop reason: SDUPTHER

## 2021-11-29 RX ORDER — PANTOPRAZOLE SODIUM 40 MG/1
40 TABLET, DELAYED RELEASE ORAL DAILY
Qty: 90 TABLET | Refills: 3 | Status: SHIPPED | OUTPATIENT
Start: 2021-11-29 | End: 2022-10-11 | Stop reason: SDUPTHER

## 2022-01-26 ENCOUNTER — TRANSCRIBE ORDER (OUTPATIENT)
Dept: SCHEDULING | Age: 77
End: 2022-01-26

## 2022-01-26 DIAGNOSIS — Z12.31 VISIT FOR SCREENING MAMMOGRAM: Primary | ICD-10-CM

## 2022-01-28 ENCOUNTER — APPOINTMENT (OUTPATIENT)
Dept: GENERAL RADIOLOGY | Age: 77
End: 2022-01-28
Attending: EMERGENCY MEDICINE
Payer: MEDICARE

## 2022-01-28 ENCOUNTER — HOSPITAL ENCOUNTER (EMERGENCY)
Age: 77
Discharge: HOME | End: 2022-01-28
Attending: EMERGENCY MEDICINE
Payer: MEDICARE

## 2022-01-28 VITALS
TEMPERATURE: 98.5 F | SYSTOLIC BLOOD PRESSURE: 152 MMHG | RESPIRATION RATE: 20 BRPM | BODY MASS INDEX: 28.93 KG/M2 | HEART RATE: 69 BPM | OXYGEN SATURATION: 98 % | WEIGHT: 180 LBS | HEIGHT: 66 IN | DIASTOLIC BLOOD PRESSURE: 86 MMHG

## 2022-01-28 DIAGNOSIS — R42 VERTIGO: ICD-10-CM

## 2022-01-28 DIAGNOSIS — R42 DIZZINESS: Primary | ICD-10-CM

## 2022-01-28 LAB
ALBUMIN SERPL-MCNC: 3.6 G/DL (ref 3.5–5)
ALBUMIN/GLOB SERPL: 1.1 {RATIO} (ref 1.1–2.2)
ALP SERPL-CCNC: 44 U/L (ref 45–117)
ALT SERPL-CCNC: 24 U/L (ref 12–78)
ANION GAP SERPL CALC-SCNC: 10 MMOL/L (ref 5–15)
APPEARANCE UR: ABNORMAL
AST SERPL W P-5'-P-CCNC: 20 U/L (ref 15–37)
ATRIAL RATE: 71 BPM
BACTERIA URNS QL MICRO: ABNORMAL /HPF
BASOPHILS # BLD: 0 K/UL (ref 0–0.1)
BASOPHILS NFR BLD: 1 % (ref 0–1)
BILIRUB SERPL-MCNC: 0.8 MG/DL (ref 0.2–1)
BILIRUB UR QL: NEGATIVE
BUN SERPL-MCNC: 15 MG/DL (ref 6–20)
BUN/CREAT SERPL: 16 (ref 12–20)
CA-I BLD-MCNC: 8.6 MG/DL (ref 8.5–10.1)
CALCULATED P AXIS, ECG09: 68 DEGREES
CALCULATED R AXIS, ECG10: 29 DEGREES
CALCULATED T AXIS, ECG11: 39 DEGREES
CHLORIDE SERPL-SCNC: 106 MMOL/L (ref 97–108)
CO2 SERPL-SCNC: 27 MMOL/L (ref 21–32)
COLOR UR: YELLOW
CREAT SERPL-MCNC: 0.92 MG/DL (ref 0.55–1.02)
DIAGNOSIS, 93000: NORMAL
DIFFERENTIAL METHOD BLD: NORMAL
EOSINOPHIL # BLD: 0.2 K/UL (ref 0–0.4)
EOSINOPHIL NFR BLD: 3 % (ref 0–7)
EPITH CASTS URNS QL MICRO: ABNORMAL /LPF
ERYTHROCYTE [DISTWIDTH] IN BLOOD BY AUTOMATED COUNT: 12.7 % (ref 11.5–14.5)
GLOBULIN SER CALC-MCNC: 3.4 G/DL (ref 2–4)
GLUCOSE SERPL-MCNC: 128 MG/DL (ref 65–100)
GLUCOSE UR STRIP.AUTO-MCNC: NEGATIVE MG/DL
HCT VFR BLD AUTO: 36.1 % (ref 35–47)
HGB BLD-MCNC: 11.9 G/DL (ref 11.5–16)
HGB UR QL STRIP: ABNORMAL
IMM GRANULOCYTES # BLD AUTO: 0 K/UL (ref 0–0.04)
IMM GRANULOCYTES NFR BLD AUTO: 0 % (ref 0–0.5)
KETONES UR QL STRIP.AUTO: 15 MG/DL
LEUKOCYTE ESTERASE UR QL STRIP.AUTO: ABNORMAL
LYMPHOCYTES # BLD: 1.3 K/UL (ref 0.8–3.5)
LYMPHOCYTES NFR BLD: 25 % (ref 12–49)
MCH RBC QN AUTO: 31.1 PG (ref 26–34)
MCHC RBC AUTO-ENTMCNC: 33 G/DL (ref 30–36.5)
MCV RBC AUTO: 94.3 FL (ref 80–99)
MONOCYTES # BLD: 0.5 K/UL (ref 0–1)
MONOCYTES NFR BLD: 10 % (ref 5–13)
NEUTS SEG # BLD: 3.2 K/UL (ref 1.8–8)
NEUTS SEG NFR BLD: 61 % (ref 32–75)
NITRITE UR QL STRIP.AUTO: NEGATIVE
P-R INTERVAL, ECG05: 162 MS
PH UR STRIP: 5 [PH] (ref 5–8)
PLATELET # BLD AUTO: 204 K/UL (ref 150–400)
PMV BLD AUTO: 9.4 FL (ref 8.9–12.9)
POTASSIUM SERPL-SCNC: 4.1 MMOL/L (ref 3.5–5.1)
PROT SERPL-MCNC: 7 G/DL (ref 6.4–8.2)
PROT UR STRIP-MCNC: ABNORMAL MG/DL
Q-T INTERVAL, ECG07: 408 MS
QRS DURATION, ECG06: 90 MS
QTC CALCULATION (BEZET), ECG08: 443 MS
RBC # BLD AUTO: 3.83 M/UL (ref 3.8–5.2)
RBC #/AREA URNS HPF: ABNORMAL /HPF (ref 0–5)
SODIUM SERPL-SCNC: 143 MMOL/L (ref 136–145)
SP GR UR REFRACTOMETRY: 1.02 (ref 1–1.03)
TROPONIN-HIGH SENSITIVITY: 7 NG/L (ref 0–51)
UA: UC IF INDICATED,UAUC: ABNORMAL
UROBILINOGEN UR QL STRIP.AUTO: 0.1 EU/DL (ref 0.2–1)
VENTRICULAR RATE, ECG03: 71 BPM
WBC # BLD AUTO: 5.2 K/UL (ref 3.6–11)
WBC URNS QL MICRO: ABNORMAL /HPF (ref 0–4)

## 2022-01-28 PROCEDURE — 36415 COLL VENOUS BLD VENIPUNCTURE: CPT

## 2022-01-28 PROCEDURE — 85025 COMPLETE CBC W/AUTO DIFF WBC: CPT

## 2022-01-28 PROCEDURE — 96374 THER/PROPH/DIAG INJ IV PUSH: CPT

## 2022-01-28 PROCEDURE — 99284 EMERGENCY DEPT VISIT MOD MDM: CPT

## 2022-01-28 PROCEDURE — 71045 X-RAY EXAM CHEST 1 VIEW: CPT

## 2022-01-28 PROCEDURE — 84484 ASSAY OF TROPONIN QUANT: CPT

## 2022-01-28 PROCEDURE — 74011250636 HC RX REV CODE- 250/636: Performed by: EMERGENCY MEDICINE

## 2022-01-28 PROCEDURE — 93005 ELECTROCARDIOGRAM TRACING: CPT

## 2022-01-28 PROCEDURE — 80053 COMPREHEN METABOLIC PANEL: CPT

## 2022-01-28 PROCEDURE — 81001 URINALYSIS AUTO W/SCOPE: CPT

## 2022-01-28 RX ORDER — MECLIZINE HCL 12.5 MG 12.5 MG/1
25 TABLET ORAL
Status: COMPLETED | OUTPATIENT
Start: 2022-01-28 | End: 2022-01-28

## 2022-01-28 RX ORDER — ONDANSETRON 2 MG/ML
4 INJECTION INTRAMUSCULAR; INTRAVENOUS
Status: COMPLETED | OUTPATIENT
Start: 2022-01-28 | End: 2022-01-28

## 2022-01-28 RX ORDER — MECLIZINE HYDROCHLORIDE 25 MG/1
25 TABLET ORAL
Qty: 10 TABLET | Refills: 0 | Status: SHIPPED | OUTPATIENT
Start: 2022-01-28 | End: 2022-04-01 | Stop reason: ALTCHOICE

## 2022-01-28 RX ORDER — ONDANSETRON 4 MG/1
4 TABLET, FILM COATED ORAL
Qty: 12 TABLET | Refills: 0 | Status: SHIPPED | OUTPATIENT
Start: 2022-01-28 | End: 2022-04-01 | Stop reason: ALTCHOICE

## 2022-01-28 RX ADMIN — SODIUM CHLORIDE 1000 ML: 9 INJECTION, SOLUTION INTRAVENOUS at 03:53

## 2022-01-28 RX ADMIN — ONDANSETRON 4 MG: 2 INJECTION INTRAMUSCULAR; INTRAVENOUS at 03:52

## 2022-01-28 RX ADMIN — MECLIZINE 25 MG: 12.5 TABLET ORAL at 03:52

## 2022-01-28 NOTE — ED NOTES
Pt discharge at this time. D/C instructions reviewed by this nurse and Pt verbalized understanding. Medications reviewed and pharmacy confirmed. Answers all questions to the best of my ability. This nurse wheeled Pt out to her car. Pt's  is driving.

## 2022-01-28 NOTE — ED PROVIDER NOTES
EMERGENCY DEPARTMENT HISTORY AND PHYSICAL EXAM      Date: 1/28/2022  Patient Name: King Lopez    History of Presenting Illness     Chief Complaint   Patient presents with    Dizziness       History Provided By: Patient    HPI: King Lopez, 78 y.o. female with a past medical history significant afib presents to the ED with cc of lightheadedness, dizziness and a hot flash sensation with some nausea since about 2030 last night. Pt on Eliquis to prevent stroke and atenolol for rate control. Pt endorses compliance with her medication regimen. She just saw her cardiologist,Dr traore who gave her clean bill of health. She has not had any afib episodes she is aware of since her ablation in 2014. She notes feeling in her usual state of health yesterday until 2030 last night. These episodes last seconds and are coming and going. She denies cough, congestion, SOB, chest pain, fevers, urinary symptoms, abd pain. She does have a queezy feeling during episodes. Pt is vaccinated and has received booster for COVID. There are no other complaints, changes, or physical findings at this time. PCP: Lroaine Novak, DO    Current Outpatient Medications   Medication Sig Dispense Refill    ondansetron hcl (Zofran) 4 mg tablet Take 1 Tablet by mouth every eight (8) hours as needed for Nausea for up to 12 doses. 12 Tablet 0    meclizine (ANTIVERT) 25 mg tablet Take 1 Tablet by mouth three (3) times daily as needed for Dizziness for up to 10 doses. 10 Tablet 0    atorvastatin (LIPITOR) 10 mg tablet Take 1 Tablet by mouth daily. 90 Tablet 4    pantoprazole (PROTONIX) 40 mg tablet Take 1 Tablet by mouth daily. 90 Tablet 3    diclofenac EC (VOLTAREN) 50 mg EC tablet Take 1 Tablet by mouth two (2) times a day. 180 Tablet 4    ibandronate (BONIVA) 150 mg tablet TAKE 1 TABLET BY MOUTH ONCE A MONTH 3 Tablet 4    rOPINIRole (REQUIP) 2 mg tablet Take 1 Tablet by mouth daily.  90 Tablet 4    aspirin delayed-release 81 mg tablet Take 81 mg by mouth daily.  atenoloL (TENORMIN) 50 mg tablet Take  by mouth daily.  calcium citrate 200 mg (950 mg) tablet Take  by mouth two (2) times a day.  nicotinic acid (NIACIN) 500 mg tablet Take 500 mg by mouth two (2) times daily (with meals).  vitamin E (AQUA GEMS) 400 unit capsule Take  by mouth daily.  rivaroxaban (Xarelto) 10 mg tablet Take  by mouth daily. Past History     Past Medical History:  Past Medical History:   Diagnosis Date    Arthritis 7/30/2020    Atrial fibrillation ([address] 7/30/2020    Back ache 7/30/2020    Disorder of bone and articular cartilage 7/30/2020    GERD (gastroesophageal reflux disease) 7/30/2020    Hypertension     Pure hypercholesterolemia 7/30/2020    Vitamin D deficiency 7/30/2020       Past Surgical History:  Past Surgical History:   Procedure Laterality Date    HX CATARACT REMOVAL  11/2012    HX COLONOSCOPY  01/26/2012    DUe 2022    HX ORTHOPAEDIC  12/01/2018    hernated disc repair    HX ORTHOPAEDIC  02/25/2020    lumbar surgery-L2-5 cage fusion    HX OTHER SURGICAL Bilateral 08/2017    Bilateral Laser Treatment    HX OTHER SURGICAL  01/2021    gum surgery    HX TUBAL LIGATION  1981    [place] CARDIAC SURG PROCEDURE UNLIST  2014    a-fib procedure       Family History:  Family History   Problem Relation Age of Onset    Cancer Mother         breast    Breast Cancer Mother     Heart Disease Father     Cancer Brother     Lung Cancer Other     Lung Cancer Other     Lung Cancer Other     Breast Cancer Sister        Social History:  Social History     Tobacco Use    Smoking status: Never Smoker    Smokeless tobacco: Never Used   Substance Use Topics    Alcohol use: Not Currently    Drug use: Never       Allergies:  No Known Allergies      Review of Systems   Review of Systems   Constitutional: Negative. HENT: Negative for congestion. Respiratory: Negative for cough, chest tightness, shortness of breath and wheezing. Cardiovascular: Negative for chest pain, palpitations and leg swelling. Gastrointestinal: Positive for nausea. Genitourinary: Negative. Musculoskeletal: Negative. Neurological: Positive for dizziness. Psychiatric/Behavioral: Negative. All other systems reviewed and are negative. Physical Exam   Physical Exam  Vitals and nursing note reviewed. Constitutional:       Appearance: Normal appearance. HENT:      Head: Normocephalic. Nose: No congestion. Eyes:      Pupils: Pupils are equal, round, and reactive to light. Cardiovascular:      Rate and Rhythm: Normal rate. Pulmonary:      Effort: Pulmonary effort is normal.   Abdominal:      Palpations: Abdomen is soft. Musculoskeletal:      Cervical back: Normal range of motion. Skin:     General: Skin is warm. Neurological:      General: No focal deficit present. Mental Status: She is alert. Psychiatric:         Mood and Affect: Mood normal.         Diagnostic Study Results     Labs -     No results found for this or any previous visit (from the past 12 hour(s)). Radiologic Studies -   XR CHEST PORT   Final Result   No acute cardiopulmonary abnormality. . Mild right lung base scar   atelectasis        CT Results  (Last 48 hours)    None        CXR Results  (Last 48 hours)               01/28/22 0303  XR CHEST PORT Final result    Impression:  No acute cardiopulmonary abnormality. . Mild right lung base scar   atelectasis       Narrative:  HISTORY:  dizziness       TECHNIQUE:  XR CHEST PORT       COMPARISON: 2/20/2020   LIMITATIONS: None       TUBES/LINES: None       LUNG PARENCHYMA: Subsegmental atelectasis or scar at the right lung base   TRACHEA/BRONCHI: Normal   PULMONARY VESSELS: Normal   PLEURA: Normal   HEART: Normal   AORTIC SHADOW:Normal.     MEDIASTINUM: Normal   BONE/SOFT TISSUES: No acute abnormality.          OTHER: None               [unfilled]    Medical Decision Making and ED Course   I am the first provider for this patient. I reviewed the vital signs, available nursing notes, past medical history, past surgical history, family history and social history. Vital Signs-Reviewed the patient's vital signs. Vitals    Recent BP Temp Pulse (Heart Rate) Resp Rate O2 Sat (%)   01/28 0346 152/86 Important   69     01/28 0345 153/78 Important   66     01/28 0343 155/82 Important   66              Initial        01/28 0215 180/93 Important  98.5 °F (36.9 °C) 75 20 98 %         Height and Weight      EKG interpretation: (Preliminary) 2:21 (2\"23)  Rhythm: PVC's; and regular . Rate (approx.): 71; Axis: normal; ID interval: normal; QRS interval: normal ; ST/T wave: normal; Other findings: borderline ekg. Records Reviewed: Nursing Notes and Old Medical Records  I personally reviewed pts office visit with her cardiologist, Dr Diana Calabrese on 1/22/22. No changes to meds were made. It notes pt saw dr Yaneth Gooden in 2014 for ablation for afib. She last saw Dr. Dylon You and underwent -ablation 03/16/2014. She also had a TTE on 5/50/19 showing normal LV size and an EF of 65%. Provider Notes (Medical Decision Making): The patient presents with dizziness with a differential diagnosis of  cardiac dysrhythm, generalized weakness, GI bleed/hypovolemia, hypertension and vasovagal episode. Pt hypertensive which may be contributing to symptoms. Will obtain an EKG, trop, cbc, cmp, UA to assess for above. In addition will check orthostatics, although less likely. Pt may be experiencing PAF. EKG in ED did not capture it. Pt told to inform us if she experiences it while here. ED Course:   Initial assessment performed. The patients presenting problems have been discussed, and they are in agreement with the care plan formulated and outlined with them. I have encouraged them to ask questions as they arise throughout their visit. ED Course as of 01/28/22 2042 Fri Jan 28, 2022 0345 Reviewed labs with pt.  She denies any UTI symptoms. When reviewing her symptoms, she states she feels best at rest. When she moves around it feels like the room is spinning and she experienced that earlier as well. Will focus on symptomatic relief now with IVF, zofran and meclizine. Pt was not orthostatic. Will reassess in one hour. [LG]   0712 Pt reports feeling better. No sensation room is spinning and she denies dizziness. She denies CP, SOB. Pt has about 1/3 L IVF left. Will complete then anticipate DC. Pt comfortable with the plan. [LG]      ED Course User Index  [LG] Abram Ashton MD         Procedures       Disposition     DISCHARGE PLAN:  1. Current Discharge Medication List      CONTINUE these medications which have NOT CHANGED    Details   atorvastatin (LIPITOR) 10 mg tablet Take 1 Tablet by mouth daily. Qty: 90 Tablet, Refills: 4    Associated Diagnoses: Pure hypercholesterolemia      pantoprazole (PROTONIX) 40 mg tablet Take 1 Tablet by mouth daily. Qty: 90 Tablet, Refills: 3    Associated Diagnoses: Gastroesophageal reflux disease without esophagitis      diclofenac EC (VOLTAREN) 50 mg EC tablet Take 1 Tablet by mouth two (2) times a day. Qty: 180 Tablet, Refills: 4      ibandronate (BONIVA) 150 mg tablet TAKE 1 TABLET BY MOUTH ONCE A MONTH  Qty: 3 Tablet, Refills: 4      rOPINIRole (REQUIP) 2 mg tablet Take 1 Tablet by mouth daily. Qty: 90 Tablet, Refills: 4      aspirin delayed-release 81 mg tablet Take 81 mg by mouth daily. atenoloL (TENORMIN) 50 mg tablet Take  by mouth daily. calcium citrate 200 mg (950 mg) tablet Take  by mouth two (2) times a day. nicotinic acid (NIACIN) 500 mg tablet Take 500 mg by mouth two (2) times daily (with meals). vitamin E (AQUA GEMS) 400 unit capsule Take  by mouth daily. rivaroxaban (Xarelto) 10 mg tablet Take  by mouth daily.            2.   Follow-up Information     Follow up With Specialties Details Why Contact Yolanda Monroe DO Family Medicine Schedule an appointment as soon as possible for a visit   [address]  [phone]          Inform your heart doctor of these events to determine if any additional evaluation is necessary. [facility]  If symptoms worsen [address] [place] [id number] [phone]        3. Return to ED if worse     Diagnosis     Clinical Impression:     ICD-10-CM ICD-9-CM    1. Dizziness  R42 780.4    2. Vertigo  R42 780.4        Attestations:    Katia Castro MD    Please note that this dictation was completed with [employer], the [employer] voice recognition software. Quite often unanticipated grammatical, syntax, homophones, and other interpretive errors are inadvertently transcribed by the computer software. Please disregard these errors. Please excuse any errors that have escaped final proofreading. Thank you.

## 2022-01-28 NOTE — ED TRIAGE NOTES
Pt states she has been lightheaded, nauseous, a hot flash feeling that's intermittent and weak since 2030. Pt has hx of a-fib and htn, takes atenolol.

## 2022-01-28 NOTE — ED NOTES
Bedside shift change report given to John Montana (oncoming nurse) by Guillermo Tinsley RN (offgoing nurse). Report included the following information SBAR, Kardex, MAR and Recent Results.

## 2022-02-18 ENCOUNTER — HOSPITAL ENCOUNTER (OUTPATIENT)
Dept: MAMMOGRAPHY | Age: 77
Discharge: HOME | End: 2022-02-18
Attending: FAMILY MEDICINE
Payer: MEDICARE

## 2022-02-18 DIAGNOSIS — Z12.31 VISIT FOR SCREENING MAMMOGRAM: ICD-10-CM

## 2022-02-18 PROCEDURE — 77063 BREAST TOMOSYNTHESIS BI: CPT

## 2022-03-03 RX ORDER — IBANDRONATE SODIUM 150 MG/1
TABLET, FILM COATED ORAL
Qty: 1 TABLET | Refills: 0 | Status: SHIPPED | OUTPATIENT
Start: 2022-03-03

## 2022-03-18 PROBLEM — M94.9 DISORDER OF BONE AND ARTICULAR CARTILAGE: Status: ACTIVE | Noted: 2020-07-30

## 2022-03-18 PROBLEM — M89.9 DISORDER OF BONE AND ARTICULAR CARTILAGE: Status: ACTIVE | Noted: 2020-07-30

## 2022-03-18 PROBLEM — M19.90 ARTHRITIS: Status: ACTIVE | Noted: 2020-07-30

## 2022-03-19 PROBLEM — E78.00 PURE HYPERCHOLESTEROLEMIA: Status: ACTIVE | Noted: 2020-07-30

## 2022-03-19 PROBLEM — E55.9 VITAMIN D DEFICIENCY: Status: ACTIVE | Noted: 2020-07-30

## 2022-03-19 PROBLEM — G25.81 RESTLESS LEG SYNDROME: Status: ACTIVE | Noted: 2020-08-12

## 2022-03-19 PROBLEM — M54.9 BACK ACHE: Status: ACTIVE | Noted: 2020-07-30

## 2022-03-19 PROBLEM — K21.9 GERD (GASTROESOPHAGEAL REFLUX DISEASE): Status: ACTIVE | Noted: 2020-07-30

## 2022-03-20 PROBLEM — I48.91 ATRIAL FIBRILLATION (HCC): Status: ACTIVE | Noted: 2020-07-30

## 2022-04-01 ENCOUNTER — OFFICE VISIT (OUTPATIENT)
Dept: PRIMARY CARE CLINIC | Age: 77
End: 2022-04-01
Payer: MEDICARE

## 2022-04-01 VITALS
HEIGHT: 66 IN | OXYGEN SATURATION: 98 % | RESPIRATION RATE: 20 BRPM | BODY MASS INDEX: 28.99 KG/M2 | DIASTOLIC BLOOD PRESSURE: 83 MMHG | SYSTOLIC BLOOD PRESSURE: 138 MMHG | WEIGHT: 180.4 LBS | TEMPERATURE: 97.4 F | HEART RATE: 64 BPM

## 2022-04-01 DIAGNOSIS — E78.00 PURE HYPERCHOLESTEROLEMIA: ICD-10-CM

## 2022-04-01 DIAGNOSIS — M19.042 ARTHRITIS OF LEFT HAND: ICD-10-CM

## 2022-04-01 DIAGNOSIS — Z98.890 S/P LUMBAR LAMINECTOMY: ICD-10-CM

## 2022-04-01 DIAGNOSIS — R29.898 LEFT LEG WEAKNESS: Primary | ICD-10-CM

## 2022-04-01 DIAGNOSIS — I48.0 PAROXYSMAL ATRIAL FIBRILLATION (HCC): ICD-10-CM

## 2022-04-01 DIAGNOSIS — G25.81 RESTLESS LEG SYNDROME: ICD-10-CM

## 2022-04-01 DIAGNOSIS — E55.9 VITAMIN D DEFICIENCY: ICD-10-CM

## 2022-04-01 DIAGNOSIS — K21.9 GASTROESOPHAGEAL REFLUX DISEASE WITHOUT ESOPHAGITIS: ICD-10-CM

## 2022-04-01 PROCEDURE — G8399 PT W/DXA RESULTS DOCUMENT: HCPCS | Performed by: FAMILY MEDICINE

## 2022-04-01 PROCEDURE — 3017F COLORECTAL CA SCREEN DOC REV: CPT | Performed by: FAMILY MEDICINE

## 2022-04-01 PROCEDURE — G8510 SCR DEP NEG, NO PLAN REQD: HCPCS | Performed by: FAMILY MEDICINE

## 2022-04-01 PROCEDURE — G8419 CALC BMI OUT NRM PARAM NOF/U: HCPCS | Performed by: FAMILY MEDICINE

## 2022-04-01 PROCEDURE — G8427 DOCREV CUR MEDS BY ELIG CLIN: HCPCS | Performed by: FAMILY MEDICINE

## 2022-04-01 PROCEDURE — 99214 OFFICE O/P EST MOD 30 MIN: CPT | Performed by: FAMILY MEDICINE

## 2022-04-01 PROCEDURE — G9899 SCRN MAM PERF RSLTS DOC: HCPCS | Performed by: FAMILY MEDICINE

## 2022-04-01 PROCEDURE — G8536 NO DOC ELDER MAL SCRN: HCPCS | Performed by: FAMILY MEDICINE

## 2022-04-01 PROCEDURE — 1101F PT FALLS ASSESS-DOCD LE1/YR: CPT | Performed by: FAMILY MEDICINE

## 2022-04-01 PROCEDURE — 1090F PRES/ABSN URINE INCON ASSESS: CPT | Performed by: FAMILY MEDICINE

## 2022-04-01 NOTE — PROGRESS NOTES
HPI     Chief Complaint:   Chief Complaint   Patient presents with   [facility] Darron Hidden is an 78 y.o. female. Previous PCP was Dr. Ángel Barnes history significant for:   Patient Active Problem List   Diagnosis Code    Arthritis M19.90    Atrial fibrillation ([address] I48.91    Back ache M54.9    Disorder of bone and articular cartilage M89.9, M94.9    GERD (gastroesophageal reflux disease) K21.9    Pure hypercholesterolemia E78.00    Vitamin D deficiency E55.9    Restless leg syndrome G25.81    S/P lumbar laminectomy Z98.890       Concerns for today's visit:  Left lower leg weakness: has been residual since back surgery in 2014. Patient states that her surgeon, Dr. Dale Colon, cleared her in 2014 but the weakness persists. It is not associated with pain. She has not seen any specialist since then. She has not been to physical therapy. S/P lumbar laminectomy: Completed in 2014 with Dr. Dale Colon. Atrial fibrillation: Status post ablation several years ago without any further episodes. She follows with Dr. Leslie Beal. She remains asymptomatic. Restless leg syndrome: Stable on Requip. Denies drowsiness. Hypercholesterolemia: She is compliant with atorvastatin daily without myalgias. Last lipid panel showed elevated LDL. Lab Results   Component Value Date/Time    Cholesterol, total 187 08/18/2021 09:15 AM    HDL Cholesterol 49 08/18/2021 09:15 AM    LDL, calculated 110 (H) 08/18/2021 09:15 AM    LDL, calculated 87 08/12/2020 08:44 AM    VLDL, calculated 28 08/18/2021 09:15 AM    VLDL, calculated 33 08/12/2020 08:44 AM    Triglyceride 159 (H) 08/18/2021 09:15 AM     Vitamin D deficiency: At last check it was right at 34. She takes a daily supplementation. GERD: Stable on Protonix daily. She denies abdominal pain. Arthritis: Patient has noticed that she has deformity in her left hand that has been ongoing for a couple of years now.   It is associated with swelling of the joints in the hand as well as pain and stiffness. She is right-hand dominant. Previous PCP evaluated the labs which noted a positive JAYLA but a negative rheumatoid factor and ESR was within normal limits. Family History reviewed    Social History  Denies smoking . Denies heavy alcohol use. No illicit drug use. Health Maintenance reviewed -      Current Outpatient Medications   Medication Sig    ibandronate (BONIVA) 150 mg tablet TAKE 1 TABLET BY MOUTH ONCE A MONTH    atorvastatin (LIPITOR) 10 mg tablet Take 1 Tablet by mouth daily.  pantoprazole (PROTONIX) 40 mg tablet Take 1 Tablet by mouth daily.  diclofenac EC (VOLTAREN) 50 mg EC tablet Take 1 Tablet by mouth two (2) times a day.  rOPINIRole (REQUIP) 2 mg tablet Take 1 Tablet by mouth daily.  atenoloL (TENORMIN) 50 mg tablet Take  by mouth daily.  nicotinic acid (NIACIN) 500 mg tablet Take 500 mg by mouth two (2) times daily (with meals).  vitamin E (AQUA GEMS) 400 unit capsule Take  by mouth daily.  rivaroxaban (Xarelto) 10 mg tablet Take  by mouth daily.  calcium citrate 200 mg (950 mg) tablet Take  by mouth two (2) times a day. No current facility-administered medications for this visit.        Allergies - reviewed:   No Known Allergies      Past Medical History - reviewed:  Past Medical History:   Diagnosis Date    Arthritis 7/30/2020    Atrial fibrillation ([address] 7/30/2020    Back ache 7/30/2020    Disorder of bone and articular cartilage 7/30/2020    GERD (gastroesophageal reflux disease) 7/30/2020    Hypertension     Pure hypercholesterolemia 7/30/2020    Vitamin D deficiency 7/30/2020       Social History - reviewed:  Social History     Tobacco Use    Smoking status: Never Smoker    Smokeless tobacco: Never Used   Substance Use Topics    Alcohol use: Not Currently    Drug use: Never       Past Surgical History - reviewed:  Past Surgical History:   Procedure Laterality Date    HX CATARACT REMOVAL 11/2012    HX COLONOSCOPY  01/26/2012    DUe 2022    HX ORTHOPAEDIC  12/01/2018    hernated disc repair    HX ORTHOPAEDIC  02/25/2020    lumbar surgery-L2-5 cage fusion    HX OTHER SURGICAL Bilateral 08/2017    Bilateral Laser Treatment    HX OTHER SURGICAL  01/2021    gum surgery    HX TUBAL LIGATION  1981    [place] CARDIAC SURG PROCEDURE UNLIST  2014    a-fib procedure         Family History - reviewed:  Family History   Problem Relation Age of Onset    Cancer Mother         breast    Breast Cancer Mother     Heart Disease Father     Cancer Brother     Lung Cancer Other     Lung Cancer Other     Lung Cancer Other     Breast Cancer Sister        Immunizations - reviewed:   Immunization History   Administered Date(s) Administered    COVID-19, Pfizer Purple top, DILUTE for use, 12+ yrs, 30mcg/0.3mL dose 02/09/2021, 03/06/2021, 10/06/2021    Influenza Vaccine 10/04/2018    Pneumococcal Polysaccharide (PPSV-23) 03/29/2014    Td 01/01/2008    Zoster Vaccine, Live 01/01/2014, 01/20/2015       Review of Systems   Review of Systems   Constitutional: Negative for fever. Respiratory: Negative for cough and shortness of breath. Cardiovascular: Negative for chest pain and palpitations. Musculoskeletal: Positive for joint pain. Negative for myalgias. Neurological: Positive for focal weakness. Negative for dizziness, tingling, speech change and headaches. Reviewed PmHx, FmHx, SocHx as well as meds and allergies, updated and dated in the chart. Objective     Visit Vitals  /83 (BP 1 Location: Left upper arm, BP Patient Position: Sitting, BP Cuff Size: Large adult)   Pulse 64   Temp 97.4 °F (36.3 °C) (Temporal)   Resp 20   Ht 5' 6\" (1.676 m)   Wt 180 lb 6.4 oz (81.8 kg)   SpO2 98%   BMI 29.12 kg/m²       Physical Exam  Vitals and nursing note reviewed. Constitutional:       General: She is not in acute distress. Cardiovascular:      Rate and Rhythm: Normal rate and regular rhythm. Pulmonary:      Effort: Pulmonary effort is normal. No respiratory distress. Breath sounds: Normal breath sounds. Musculoskeletal:      Comments: Left hand with swan-neck deformity noted of the third phalange. There is tenderness and edema noted at multiple PIPs and MCPs of the left hand. Right hand without deformity. There is no erythema of any joints of either hand. Neurological:      General: No focal deficit present. Mental Status: She is alert and oriented to person, place, and time. Comments: Right lower extremity with strength 4 out of 5 in the proximal and distal muscle groups. Left lower extremity strength 5 out of 5 in the proximal and distal muscle groups. Gait is antalgic. Patellar DTR was 2+ bilaterally. Assessment and Plan     Diagnoses and all orders for this visit:    1. Left leg weakness  Comments:  Residual for years after surgery. Ortho has cleared her. She feels symptoms are possibly worsening. Referring to neurology. Orders:  -     REFERRAL TO NEUROLOGY    2. S/P lumbar laminectomy  Comments: In 2014 with Dr. Dash Cheng. 3. Arthritis of left hand  Comments:  [place]-neck deformity along with history and positive JAYLA. Referring to rheumatology for further work-up. Supportive care measures discussed in the interim. 4. Paroxysmal atrial fibrillation (HCC)  Comments: Follows with Dr. Serina Contreras and is asymptomatic. Assessment & Plan:  S/p Ablation 2014 with Dr. Frances Segura. Asymptomatic since. Follows with Dr. Serina Contreras. Orders:  -     CBC W/O DIFF    5. Gastroesophageal reflux disease without esophagitis  Comments:  Stable. Continue Protonix daily. Orders:  -     CBC W/O DIFF    6. Pure hypercholesterolemia  Comments:  Checking labs today. Patient is fasting. Consider increasing statin if LDL remains elevated.   Orders:  -     CBC W/O DIFF  -     METABOLIC PANEL, COMPREHENSIVE  -     LIPID PANEL    7. Vitamin D deficiency  Comments:  Continue daily supplementation. Orders:  -     VITAMIN D, 25 HYDROXY    8. Restless leg syndrome  Comments:  Continue Requip. Follow-up and Dispositions    · Return in about 6 months (around 10/1/2022) for annual physical, chronic follow up. I discussed the aforementioned diagnoses with the patient as well as the plan of care. All questions were answered and an AVS was provided.        Atif Alvarez MD  [facility]  [address], [address] [place] [address]

## 2022-04-01 NOTE — PROGRESS NOTES
1. \"Have you been to the ER, urgent care clinic since your last visit? Hospitalized since your last visit? \" No    2. \"Have you seen or consulted any other health care providers outside of the [address] [place] [address] since your last visit? \" Cardil    3. For patients aged 39-70: Has the patient had a colonoscopy / FIT/ Cologuard? No      If the patient is female:    4. For patients aged 41-77: Has the patient had a mammogram within the past 2 years? Yes - Care Gap present. Most recent result on file      5. For patients aged 21-65: Has the patient had a pap smear?  NA - based on age or sex  Visit Vitals  /83 (BP 1 Location: Left upper arm, BP Patient Position: Sitting, BP Cuff Size: Large adult)   Pulse 64   Temp 97.4 °F (36.3 °C) (Temporal)   Resp 20   Ht 5' 6\" (1.676 m)   Wt 180 lb 6.4 oz (81.8 kg)   SpO2 98%   BMI 29.12 kg/m²     Chief Complaint   Patient presents with   [address]

## 2022-04-02 LAB
25(OH)D3+25(OH)D2 SERPL-MCNC: 31.4 NG/ML (ref 30–100)
ALBUMIN SERPL-MCNC: 4.3 G/DL (ref 3.7–4.7)
ALBUMIN/GLOB SERPL: 1.7 {RATIO} (ref 1.2–2.2)
ALP SERPL-CCNC: 44 IU/L (ref 44–121)
ALT SERPL-CCNC: 27 IU/L (ref 0–32)
AST SERPL-CCNC: 30 IU/L (ref 0–40)
BILIRUB SERPL-MCNC: 1.1 MG/DL (ref 0–1.2)
BUN SERPL-MCNC: 10 MG/DL (ref 8–27)
BUN/CREAT SERPL: 13 (ref 12–28)
CALCIUM SERPL-MCNC: 9.7 MG/DL (ref 8.7–10.3)
CHLORIDE SERPL-SCNC: 103 MMOL/L (ref 96–106)
CHOLEST SERPL-MCNC: 187 MG/DL (ref 100–199)
CO2 SERPL-SCNC: 24 MMOL/L (ref 20–29)
CREAT SERPL-MCNC: 0.8 MG/DL (ref 0.57–1)
EGFR: 77 ML/MIN/1.73
ERYTHROCYTE [DISTWIDTH] IN BLOOD BY AUTOMATED COUNT: 13 % (ref 11.7–15.4)
GLOBULIN SER CALC-MCNC: 2.5 G/DL (ref 1.5–4.5)
GLUCOSE SERPL-MCNC: 92 MG/DL (ref 65–99)
HCT VFR BLD AUTO: 37.1 % (ref 34–46.6)
HDLC SERPL-MCNC: 41 MG/DL
HGB BLD-MCNC: 12.1 G/DL (ref 11.1–15.9)
LDLC SERPL CALC-MCNC: 104 MG/DL (ref 0–99)
MCH RBC QN AUTO: 30.3 PG (ref 26.6–33)
MCHC RBC AUTO-ENTMCNC: 32.6 G/DL (ref 31.5–35.7)
MCV RBC AUTO: 93 FL (ref 79–97)
PLATELET # BLD AUTO: 212 X10E3/UL (ref 150–450)
POTASSIUM SERPL-SCNC: 4.3 MMOL/L (ref 3.5–5.2)
PROT SERPL-MCNC: 6.8 G/DL (ref 6–8.5)
RBC # BLD AUTO: 4 X10E6/UL (ref 3.77–5.28)
SODIUM SERPL-SCNC: 140 MMOL/L (ref 134–144)
TRIGL SERPL-MCNC: 247 MG/DL (ref 0–149)
VLDLC SERPL CALC-MCNC: 42 MG/DL (ref 5–40)
WBC # BLD AUTO: 5.5 X10E3/UL (ref 3.4–10.8)

## 2022-04-05 DIAGNOSIS — E78.00 PURE HYPERCHOLESTEROLEMIA: ICD-10-CM

## 2022-04-05 RX ORDER — ATORVASTATIN CALCIUM 20 MG/1
20 TABLET, FILM COATED ORAL DAILY
Qty: 90 TABLET | Refills: 1 | Status: SHIPPED | OUTPATIENT
Start: 2022-04-05

## 2022-04-05 NOTE — PROGRESS NOTES
Alireza Javier,    Your cholesterol is still high. We need to increase your atorvastatin to get better control and reduce your risk of heart attack and/or stroke. You currently are on a low dose, 10mg. Lets increase this to 20mg and recheck in 3-6 months, fasting labs. I will update the prescription with your pharmacy. Otherwise, your vitamin D, kidney function, liver function, electrolytes, and blood counts are normal.    Thank you for the privilege to participate in your healthcare.     Dr. Velasquez

## 2022-06-28 ENCOUNTER — OFFICE VISIT (OUTPATIENT)
Dept: NEUROLOGY | Age: 77
End: 2022-06-28
Payer: MEDICARE

## 2022-06-28 VITALS
OXYGEN SATURATION: 97 % | HEART RATE: 65 BPM | BODY MASS INDEX: 29.05 KG/M2 | RESPIRATION RATE: 16 BRPM | DIASTOLIC BLOOD PRESSURE: 92 MMHG | WEIGHT: 180 LBS | SYSTOLIC BLOOD PRESSURE: 150 MMHG

## 2022-06-28 DIAGNOSIS — R29.2 ABNORMAL DTR (DEEP TENDON REFLEX): ICD-10-CM

## 2022-06-28 DIAGNOSIS — R29.898 RIGHT LEG WEAKNESS: Primary | ICD-10-CM

## 2022-06-28 PROCEDURE — G8427 DOCREV CUR MEDS BY ELIG CLIN: HCPCS | Performed by: PSYCHIATRY & NEUROLOGY

## 2022-06-28 PROCEDURE — G8432 DEP SCR NOT DOC, RNG: HCPCS | Performed by: PSYCHIATRY & NEUROLOGY

## 2022-06-28 PROCEDURE — 1101F PT FALLS ASSESS-DOCD LE1/YR: CPT | Performed by: PSYCHIATRY & NEUROLOGY

## 2022-06-28 PROCEDURE — G8399 PT W/DXA RESULTS DOCUMENT: HCPCS | Performed by: PSYCHIATRY & NEUROLOGY

## 2022-06-28 PROCEDURE — 99204 OFFICE O/P NEW MOD 45 MIN: CPT | Performed by: PSYCHIATRY & NEUROLOGY

## 2022-06-28 PROCEDURE — 1123F ACP DISCUSS/DSCN MKR DOCD: CPT | Performed by: PSYCHIATRY & NEUROLOGY

## 2022-06-28 PROCEDURE — G9899 SCRN MAM PERF RSLTS DOC: HCPCS | Performed by: PSYCHIATRY & NEUROLOGY

## 2022-06-28 PROCEDURE — G8417 CALC BMI ABV UP PARAM F/U: HCPCS | Performed by: PSYCHIATRY & NEUROLOGY

## 2022-06-28 PROCEDURE — 3017F COLORECTAL CA SCREEN DOC REV: CPT | Performed by: PSYCHIATRY & NEUROLOGY

## 2022-06-28 PROCEDURE — 1090F PRES/ABSN URINE INCON ASSESS: CPT | Performed by: PSYCHIATRY & NEUROLOGY

## 2022-06-28 PROCEDURE — G8536 NO DOC ELDER MAL SCRN: HCPCS | Performed by: PSYCHIATRY & NEUROLOGY

## 2022-06-28 NOTE — PROGRESS NOTES
[facility] and [address] at [facility] at [address] [facility] [address] [place], [address] [facility] [address] [address]  [phone] Office  [phone]           Referring: Hanny Blocktanikaeriberto [address]    Chief Complaint   Patient presents with    New Patient    Extremity Weakness     right leg, will sometimes give out and cause near fall. Lumbar surgery with Dr. Ravin Lucia in 2020. thought leg would get stronger but has not     60-year-old lady presents today accompanied by her  for initial neurologic consultation regarding right leg weakness. She says she has had weakness in her right leg since her lumbar surgery back in 2020. She says the leg will give way. It is hard for her to use steps. She has pain in the right thigh which increases when she walks. Prior to her surgery she had leg weakness and it has not gotten better question worse since then. Prior to the surgery she had right-sided radicular pain radiating from the lumbar region down the right leg. That pain has dissipated. The anticipation was that the right leg weakness would get better after surgery but it did not. She has gone to the hospital for dizziness and was told she was dehydrated. She has had these dizzy spells at times and she attributes it to not drinking water    Record review finds emergency department visit January 28 of this year where patient presented with lightheaded dizziness and hot flashing sensation. She is on Eliquis secondary to atrial fibrillation. She had an examination that was unremarkable she was describing spinning. They gave her some IV fluid, Zofran and Antivert. She was feeling better after an hour and she was discharged.   CT scan of the head was not done  Chest x-ray with right lung base atelectasis  CBC unremarkable  Metabolic panel with glucose 128 otherwise unremarkable  Troponin normal    Most recent laboratory analysis April 1 of this year  Vitamin D normal  Metabolic panel unremarkable    Dr. Sruthi Real, orthopedics, note from December 10, 2020 where patient was being followed up 9 months status post L2-L5 lateral interbody fusion and pedicle screw instrumentation with minimal low back pain and no residual right anterior thigh burning and numbness. She was said to be able to walk and stand for longer periods of time. Lumbar x-rays showed hardware in place. She was to follow-up as needed  Lipid panel with     Past Medical History:   Diagnosis Date    Arthritis 7/30/2020    Atrial fibrillation ([address] 7/30/2020    Back ache 7/30/2020    Disorder of bone and articular cartilage 7/30/2020    GERD (gastroesophageal reflux disease) 7/30/2020    Hearing reduced 06/2017    Hypertension     Pure hypercholesterolemia 7/30/2020    Vitamin D deficiency 7/30/2020       Past Surgical History:   Procedure Laterality Date    HX CATARACT REMOVAL  11/2012    HX COLONOSCOPY  01/26/2012    DUe 2022    HX ORTHOPAEDIC  12/01/2018    hernated disc repair    HX ORTHOPAEDIC  02/25/2020    lumbar surgery-L2-5 cage fusion    HX OTHER SURGICAL Bilateral 08/2017    Bilateral Laser Treatment    HX OTHER SURGICAL  01/2021    gum surgery    HX TUBAL LIGATION  1981    ME CARDIAC SURG PROCEDURE UNLIST  2014    a-fib procedure       Current Outpatient Medications   Medication Sig Dispense Refill    atorvastatin (LIPITOR) 20 mg tablet Take 1 Tablet by mouth daily. 90 Tablet 1    ibandronate (BONIVA) 150 mg tablet TAKE 1 TABLET BY MOUTH ONCE A MONTH 1 Tablet 0    pantoprazole (PROTONIX) 40 mg tablet Take 1 Tablet by mouth daily. 90 Tablet 3    diclofenac EC (VOLTAREN) 50 mg EC tablet Take 1 Tablet by mouth two (2) times a day. 180 Tablet 4    rOPINIRole (REQUIP) 2 mg tablet Take 1 Tablet by mouth daily.  90 Tablet 4    atenoloL (TENORMIN) 50 mg tablet Take  by mouth daily.  calcium citrate 200 mg (950 mg) tablet Take  by mouth two (2) times a day.  nicotinic acid (NIACIN) 500 mg tablet Take 500 mg by mouth two (2) times daily (with meals).  vitamin E (AQUA GEMS) 400 unit capsule Take  by mouth daily.  rivaroxaban (Xarelto) 10 mg tablet Take  by mouth daily. No Known Allergies    Social History     Tobacco Use    Smoking status: Never Smoker    Smokeless tobacco: Never Used   Vaping Use    Vaping Use: Never used   Substance Use Topics    Alcohol use: Not Currently     Alcohol/week: 0.0 standard drinks    Drug use: Never       Family History   Problem Relation Age of Onset    Cancer Mother         breast    Breast Cancer Mother     Heart Disease Father     Cancer Brother     Lung Cancer Other     Lung Cancer Other     Lung Cancer Other     Breast Cancer Sister     Heart Disease Brother     Heart Disease Brother     Cancer Sister     Cancer Sister     Cancer Sister     Cancer Brother     Cancer Brother     Cancer Brother     Cancer Brother     Cancer Brother        Review of Systems  Pertinent positives and negatives as noted. Examination  Visit Vitals  BP (!) 150/92 (BP 1 Location: Left upper arm, BP Patient Position: Sitting, BP Cuff Size: Adult)   Pulse 65   Resp 16   Wt 81.6 kg (180 lb)   SpO2 97%   BMI 29.05 kg/m²     She is a pleasant lady. She is awake alert and oriented. She has normal speech and normal language. Her cognition is normal.  Cranial nerves are intact 2-12. No nystagmus. She has no pronation and no drift. She was this fully in the upper and lower extremities in all muscle groups today testing. Reflexes are more brisk in the right upper and lower versus the left. Toes are mute. No Olivia. No ataxia.     Impression/Plan  Right lower extremity weakness and we discussed that this may be secondary to nerve compression/root compression that just is not going to heal secondary to being compressed for a prolonged time prior to surgery versus given her increased right upper and lower extremity reflexes potential for a central process  EMG of the right lower extremity  MRI of the brain  Carotid Doppler  Follow-up after    Michelle De La Cruz MD          This note was created using voice recognition software. Despite editing, there may be syntax errors.

## 2022-06-28 NOTE — PATIENT INSTRUCTIONS
RESULT POLICY      If we have ordered testing for you, know that; \"NO NEWS IS GOOD NEWS! \"     It is our policy that we no longer call patients with results, nor do we  give test results over the phone. We schedule follow up appointments so that your results can be discussed in person. This allows you to address any questions you have regarding the results. If you choose to go to an imaging center outside of [facility], it is your responsibility to bring imaging report and disc to follow up appointment. If something of concern is revealed on your test, we will contact you to discuss the matter and if needed schedule a sooner follow up appointment. Additionally, results may be found by using the My Chart feature and one of our patient service representatives at the  can give you instructions on how to access this feature to utilize our electronic medical record system. Thank you for your understanding. [facility]   Statement to Patients  April 1, 2014      In an effort to ensure the large volume of patient prescription refills is processed in the most efficient and expeditious manner, we are asking our patients to assist us by calling your Pharmacy for all prescription refills, this will include also your  Mail Order Pharmacy. The pharmacy will contact our office electronically to continue the refill process. Please do not wait until the last minute to call your pharmacy. We need at least 48 hours (2days) to fill prescriptions. We also encourage you to call your pharmacy before going to  your prescription to make sure it is ready. With regard to controlled substance prescription refill requests (narcotic refills) that need to be picked up at our office, we ask your cooperation by providing us with at least 72 hours (3days) notice that you will need a refill.     We will not refill narcotic prescription refill requests after 4:00pm on any weekday, Monday through Thursday, or after 2:00pm on Fridays, or on the weekends. We encourage everyone to explore another way of getting your prescription refill request processed using Rev[employer], our patient web portal through our electronic medical record system. Rev[employer] is an efficient and effective way to communicate your medication request directly to the office and  downloadable as an hiral on your smart phone . [employer] also features a review functionality that allows you to view your medication list as well as leave messages for your physician. Are you ready to get connected? If so please review the attatched instructions or speak to any of our staff to get you set up right away! Thank you so much for your cooperation. Should you have any questions please contact our Practice Administrator.

## 2022-07-05 ENCOUNTER — HOSPITAL ENCOUNTER (OUTPATIENT)
Dept: MRI IMAGING | Age: 77
Discharge: HOME | End: 2022-07-05
Payer: MEDICARE

## 2022-07-05 DIAGNOSIS — R29.2 ABNORMAL DTR (DEEP TENDON REFLEX): ICD-10-CM

## 2022-07-05 DIAGNOSIS — R29.898 RIGHT LEG WEAKNESS: ICD-10-CM

## 2022-07-05 PROCEDURE — 70551 MRI BRAIN STEM W/O DYE: CPT

## 2022-08-18 ENCOUNTER — TELEPHONE (OUTPATIENT)
Dept: PRIMARY CARE CLINIC | Age: 77
End: 2022-08-18

## 2022-08-19 ENCOUNTER — OFFICE VISIT (OUTPATIENT)
Dept: PRIMARY CARE CLINIC | Age: 77
End: 2022-08-19
Payer: MEDICARE

## 2022-08-19 VITALS
TEMPERATURE: 97.3 F | BODY MASS INDEX: 29.12 KG/M2 | RESPIRATION RATE: 20 BRPM | SYSTOLIC BLOOD PRESSURE: 122 MMHG | HEART RATE: 65 BPM | WEIGHT: 181.2 LBS | HEIGHT: 66 IN | DIASTOLIC BLOOD PRESSURE: 68 MMHG | OXYGEN SATURATION: 98 %

## 2022-08-19 DIAGNOSIS — R30.0 DYSURIA: Primary | ICD-10-CM

## 2022-08-19 DIAGNOSIS — N30.90 CYSTITIS WITHOUT HEMATURIA: ICD-10-CM

## 2022-08-19 DIAGNOSIS — D22.9 FLESHY SKIN MOLE: ICD-10-CM

## 2022-08-19 LAB
BILIRUB UR QL STRIP: NORMAL
GLUCOSE UR-MCNC: NEGATIVE MG/DL
KETONES P FAST UR STRIP-MCNC: NORMAL MG/DL
PH UR STRIP: 5.5 [PH] (ref 4.6–8)
PROT UR QL STRIP: NEGATIVE
SP GR UR STRIP: 1.02 (ref 1–1.03)
UA UROBILINOGEN AMB POC: NORMAL (ref 0.2–1)
URINALYSIS CLARITY POC: CLEAR
URINALYSIS COLOR POC: YELLOW
URINE BLOOD POC: NORMAL
URINE LEUKOCYTES POC: NORMAL
URINE NITRITES POC: NEGATIVE

## 2022-08-19 PROCEDURE — 1101F PT FALLS ASSESS-DOCD LE1/YR: CPT | Performed by: FAMILY MEDICINE

## 2022-08-19 PROCEDURE — 1123F ACP DISCUSS/DSCN MKR DOCD: CPT | Performed by: FAMILY MEDICINE

## 2022-08-19 PROCEDURE — G8510 SCR DEP NEG, NO PLAN REQD: HCPCS | Performed by: FAMILY MEDICINE

## 2022-08-19 PROCEDURE — 3017F COLORECTAL CA SCREEN DOC REV: CPT | Performed by: FAMILY MEDICINE

## 2022-08-19 PROCEDURE — 1090F PRES/ABSN URINE INCON ASSESS: CPT | Performed by: FAMILY MEDICINE

## 2022-08-19 PROCEDURE — G9899 SCRN MAM PERF RSLTS DOC: HCPCS | Performed by: FAMILY MEDICINE

## 2022-08-19 PROCEDURE — G8427 DOCREV CUR MEDS BY ELIG CLIN: HCPCS | Performed by: FAMILY MEDICINE

## 2022-08-19 PROCEDURE — 81003 URINALYSIS AUTO W/O SCOPE: CPT | Performed by: FAMILY MEDICINE

## 2022-08-19 PROCEDURE — 99213 OFFICE O/P EST LOW 20 MIN: CPT | Performed by: FAMILY MEDICINE

## 2022-08-19 PROCEDURE — G8417 CALC BMI ABV UP PARAM F/U: HCPCS | Performed by: FAMILY MEDICINE

## 2022-08-19 PROCEDURE — G8399 PT W/DXA RESULTS DOCUMENT: HCPCS | Performed by: FAMILY MEDICINE

## 2022-08-19 PROCEDURE — G8536 NO DOC ELDER MAL SCRN: HCPCS | Performed by: FAMILY MEDICINE

## 2022-08-19 RX ORDER — NITROFURANTOIN 25; 75 MG/1; MG/1
100 CAPSULE ORAL 2 TIMES DAILY
Qty: 10 CAPSULE | Refills: 0 | Status: SHIPPED | OUTPATIENT
Start: 2022-08-19

## 2022-08-19 NOTE — PROGRESS NOTES
HPI     Chief Complaint   Patient presents with    Urinary Pain     Past couple of weeks. HPI:  Benigno Callahan is a 78 y.o. female who has a history of A Fib, GERD, RLS, Vitamin D Deficiency:    Dysuria: A few weeks of dysuria non improving despite using AZO. Feels like prior UTIs. No fever or hematuria. Mole: Patient well for several years like to have it removed. No Known Allergies    Current Outpatient Medications   Medication Sig    nitrofurantoin, macrocrystal-monohydrate, (Macrobid) 100 mg capsule Take 1 Capsule by mouth two (2) times a day.  atorvastatin (LIPITOR) 20 mg tablet Take 1 Tablet by mouth daily.  ibandronate (BONIVA) 150 mg tablet TAKE 1 TABLET BY MOUTH ONCE A MONTH    pantoprazole (PROTONIX) 40 mg tablet Take 1 Tablet by mouth daily.  diclofenac EC (VOLTAREN) 50 mg EC tablet Take 1 Tablet by mouth two (2) times a day.  rOPINIRole (REQUIP) 2 mg tablet Take 1 Tablet by mouth daily.  atenoloL (TENORMIN) 50 mg tablet Take  by mouth daily.  calcium citrate 200 mg (950 mg) tablet Take  by mouth two (2) times a day.  nicotinic acid (NIACIN) 500 mg tablet Take 500 mg by mouth two (2) times daily (with meals).  vitamin E (AQUA GEMS) 400 unit capsule Take  by mouth daily.  rivaroxaban (XARELTO) 10 mg tablet Take  by mouth daily. No current facility-administered medications for this visit. Review of Systems   Constitutional:  Negative for chills and fever. Gastrointestinal:  Negative for abdominal pain and vomiting. Genitourinary:  Positive for dysuria. Negative for hematuria. Reviewed PmHx, FmHx, SocHx as well as meds and allergies, updated and dated in the chart.          Objective     Visit Vitals  /68 (BP 1 Location: Right upper arm, BP Patient Position: Sitting, BP Cuff Size: Adult)   Pulse 65   Temp 97.3 °F (36.3 °C) (Temporal)   Resp 20   Ht 5' 6\" (1.676 m)   Wt 181 lb 3.2 oz (82.2 kg)   SpO2 98%   BMI 29.25 kg/m²     Physical Exam  Vitals and nursing note reviewed. Constitutional:       Appearance: Normal appearance. Cardiovascular:      Rate and Rhythm: Normal rate. Pulmonary:      Effort: Pulmonary effort is normal.      Breath sounds: Normal breath sounds. Skin:     Comments: Pinpoint pigmented mole to left cheek with regular borders. Neurological:      Mental Status: She is alert. Results for orders placed or performed in visit on 08/19/22   AMB POC URINALYSIS DIP STICK AUTO W/O MICRO     Status: None   Result Value Ref Range Status    Color (UA POC) Yellow  Final    Clarity (UA POC) Clear  Final    Glucose (UA POC) Negative Negative Final    Bilirubin (UA POC) 1+ Negative Final    Ketones (UA POC) Trace Negative Final    Specific gravity (UA POC) 1.025 1.001 - 1.035 Final    Blood (UA POC) Trace Negative Final    pH (UA POC) 5.5 4.6 - 8.0 Final    Protein (UA POC) Negative Negative Final    Urobilinogen (UA POC) 0.2 mg/dL 0.2 - 1 Final    Nitrites (UA POC) Negative Negative Final    Leukocyte esterase (UA POC) 2+ Negative Final                    Assessment and Plan     Diagnoses and all orders for this visit:    1. Dysuria  Comments:  Macrobid twice a day for 5 days. Urine culture pending. Warning signs reviewed. Orders:  -     AMB POC URINALYSIS DIP STICK AUTO W/O MICRO  -     CULTURE, URINE  -     nitrofurantoin, macrocrystal-monohydrate, (Macrobid) 100 mg capsule; Take 1 Capsule by mouth two (2) times a day. 2. Cystitis without hematuria  -     nitrofurantoin, macrocrystal-monohydrate, (Macrobid) 100 mg capsule; Take 1 Capsule by mouth two (2) times a day. 3. Fleshy skin mole  -     REFERRAL TO DERMATOLOGY         As applicable:  Medication Side Effects and Warnings were discussed with patient. Patient Labs were reviewed and or requested. Patient Past Records were reviewed and or requested. Follow-up and Dispositions    Return if symptoms worsen or fail to improve.           I have discussed the diagnosis with the patient and the intended plan as seen in the above orders. The patient has received an after-visit summary and questions were answered concerning future plans. I have discussed medication side effects and warnings with the patient as well.       Lita Moreira MD  [address] [place] [address]

## 2022-08-19 NOTE — PROGRESS NOTES
1. \"Have you been to the ER, urgent care clinic since your last visit? Hospitalized since your last visit? \" No    2. \"Have you seen or consulted any other health care providers outside of the [address] [place] [address] since your last visit? \" No     3. For patients aged 39-70: Has the patient had a colonoscopy / FIT/ Cologuard? No      If the patient is female:    4. For patients aged 41-77: Has the patient had a mammogram within the past 2 years? Yes - Care Gap present. Most recent result on file      5. For patients aged 21-65: Has the patient had a pap smear? NA - based on age or sex  Visit Vitals  /68 (BP 1 Location: Right upper arm, BP Patient Position: Sitting, BP Cuff Size: Adult)   Pulse 65   Temp 97.3 °F (36.3 °C) (Temporal)   Resp 20   Ht 5' 6\" (1.676 m)   Wt 181 lb 3.2 oz (82.2 kg)   SpO2 98%   BMI 29.25 kg/m²     Chief Complaint   Patient presents with    Urinary Pain     Past couple of weeks.

## 2022-08-22 LAB — BACTERIA UR CULT: NORMAL

## 2022-08-22 NOTE — PROGRESS NOTES
Pls call patient. Advise urine culture was negative for infection. Stop abx (if any left). If symptoms persist, follow up. Seek immediate attention for worsening symptoms. ,

## 2022-09-12 ENCOUNTER — HOSPITAL ENCOUNTER (EMERGENCY)
Age: 77
Discharge: HOME | End: 2022-09-12
Attending: EMERGENCY MEDICINE | Admitting: EMERGENCY MEDICINE
Payer: MEDICARE

## 2022-09-12 ENCOUNTER — APPOINTMENT (OUTPATIENT)
Dept: GENERAL RADIOLOGY | Age: 77
End: 2022-09-12
Attending: EMERGENCY MEDICINE
Payer: MEDICARE

## 2022-09-12 VITALS
SYSTOLIC BLOOD PRESSURE: 166 MMHG | HEART RATE: 64 BPM | WEIGHT: 180 LBS | OXYGEN SATURATION: 96 % | HEIGHT: 66 IN | BODY MASS INDEX: 28.93 KG/M2 | DIASTOLIC BLOOD PRESSURE: 90 MMHG | TEMPERATURE: 97.7 F | RESPIRATION RATE: 16 BRPM

## 2022-09-12 DIAGNOSIS — R07.9 ACUTE CHEST PAIN: Primary | ICD-10-CM

## 2022-09-12 LAB
ALBUMIN SERPL-MCNC: 3.7 G/DL (ref 3.5–5)
ALBUMIN/GLOB SERPL: 1 {RATIO} (ref 1.1–2.2)
ALP SERPL-CCNC: 44 U/L (ref 45–117)
ALT SERPL-CCNC: 33 U/L (ref 12–78)
ANION GAP SERPL CALC-SCNC: 10 MMOL/L (ref 5–15)
AST SERPL W P-5'-P-CCNC: 27 U/L (ref 15–37)
ATRIAL RATE: 60 BPM
BASOPHILS # BLD: 0 K/UL (ref 0–0.1)
BASOPHILS NFR BLD: 0 % (ref 0–1)
BILIRUB SERPL-MCNC: 0.6 MG/DL (ref 0.2–1)
BUN SERPL-MCNC: 13 MG/DL (ref 6–20)
BUN/CREAT SERPL: 16 (ref 12–20)
CA-I BLD-MCNC: 8.7 MG/DL (ref 8.5–10.1)
CALCULATED P AXIS, ECG09: 61 DEGREES
CALCULATED R AXIS, ECG10: 42 DEGREES
CALCULATED T AXIS, ECG11: 50 DEGREES
CHLORIDE SERPL-SCNC: 105 MMOL/L (ref 97–108)
CO2 SERPL-SCNC: 27 MMOL/L (ref 21–32)
CREAT SERPL-MCNC: 0.83 MG/DL (ref 0.55–1.02)
DIAGNOSIS, 93000: NORMAL
DIFFERENTIAL METHOD BLD: ABNORMAL
EOSINOPHIL # BLD: 0.2 K/UL (ref 0–0.4)
EOSINOPHIL NFR BLD: 4 % (ref 0–7)
ERYTHROCYTE [DISTWIDTH] IN BLOOD BY AUTOMATED COUNT: 12.8 % (ref 11.5–14.5)
GLOBULIN SER CALC-MCNC: 3.8 G/DL (ref 2–4)
GLUCOSE SERPL-MCNC: 122 MG/DL (ref 65–100)
HCT VFR BLD AUTO: 34.1 % (ref 35–47)
HGB BLD-MCNC: 11.4 G/DL (ref 11.5–16)
IMM GRANULOCYTES # BLD AUTO: 0 K/UL (ref 0–0.04)
IMM GRANULOCYTES NFR BLD AUTO: 0 % (ref 0–0.5)
INR PPP: 1 (ref 0.9–1.1)
LYMPHOCYTES # BLD: 1.8 K/UL (ref 0.8–3.5)
LYMPHOCYTES NFR BLD: 36 % (ref 12–49)
MAGNESIUM SERPL-MCNC: 1.8 MG/DL (ref 1.6–2.4)
MCH RBC QN AUTO: 30.5 PG (ref 26–34)
MCHC RBC AUTO-ENTMCNC: 33.4 G/DL (ref 30–36.5)
MCV RBC AUTO: 91.2 FL (ref 80–99)
MONOCYTES # BLD: 0.7 K/UL (ref 0–1)
MONOCYTES NFR BLD: 14 % (ref 5–13)
NEUTS SEG # BLD: 2.3 K/UL (ref 1.8–8)
NEUTS SEG NFR BLD: 46 % (ref 32–75)
P-R INTERVAL, ECG05: 182 MS
PLATELET # BLD AUTO: 218 K/UL (ref 150–400)
PMV BLD AUTO: 8.9 FL (ref 8.9–12.9)
POTASSIUM SERPL-SCNC: 3.3 MMOL/L (ref 3.5–5.1)
PROT SERPL-MCNC: 7.5 G/DL (ref 6.4–8.2)
PROTHROMBIN TIME: 10.4 SEC (ref 9–11.1)
Q-T INTERVAL, ECG07: 434 MS
QRS DURATION, ECG06: 92 MS
QTC CALCULATION (BEZET), ECG08: 434 MS
RBC # BLD AUTO: 3.74 M/UL (ref 3.8–5.2)
SODIUM SERPL-SCNC: 142 MMOL/L (ref 136–145)
TROPONIN-HIGH SENSITIVITY: 9 NG/L (ref 0–51)
VENTRICULAR RATE, ECG03: 60 BPM
WBC # BLD AUTO: 4.9 K/UL (ref 3.6–11)

## 2022-09-12 PROCEDURE — 84484 ASSAY OF TROPONIN QUANT: CPT

## 2022-09-12 PROCEDURE — 36415 COLL VENOUS BLD VENIPUNCTURE: CPT

## 2022-09-12 PROCEDURE — 93005 ELECTROCARDIOGRAM TRACING: CPT

## 2022-09-12 PROCEDURE — 74011000250 HC RX REV CODE- 250: Performed by: EMERGENCY MEDICINE

## 2022-09-12 PROCEDURE — 83735 ASSAY OF MAGNESIUM: CPT

## 2022-09-12 PROCEDURE — 71045 X-RAY EXAM CHEST 1 VIEW: CPT

## 2022-09-12 PROCEDURE — 99285 EMERGENCY DEPT VISIT HI MDM: CPT

## 2022-09-12 PROCEDURE — 80053 COMPREHEN METABOLIC PANEL: CPT

## 2022-09-12 PROCEDURE — 85025 COMPLETE CBC W/AUTO DIFF WBC: CPT

## 2022-09-12 PROCEDURE — 74011250637 HC RX REV CODE- 250/637: Performed by: EMERGENCY MEDICINE

## 2022-09-12 PROCEDURE — 85610 PROTHROMBIN TIME: CPT

## 2022-09-12 RX ORDER — ASPIRIN 325 MG
325 TABLET ORAL ONCE
Status: COMPLETED | OUTPATIENT
Start: 2022-09-12 | End: 2022-09-12

## 2022-09-12 RX ORDER — SODIUM CHLORIDE 0.9 % (FLUSH) 0.9 %
5-40 SYRINGE (ML) INJECTION AS NEEDED
Status: DISCONTINUED | OUTPATIENT
Start: 2022-09-12 | End: 2022-09-12 | Stop reason: HOSPADM

## 2022-09-12 RX ORDER — SODIUM CHLORIDE 0.9 % (FLUSH) 0.9 %
5-40 SYRINGE (ML) INJECTION EVERY 8 HOURS
Status: DISCONTINUED | OUTPATIENT
Start: 2022-09-12 | End: 2022-09-12 | Stop reason: HOSPADM

## 2022-09-12 RX ADMIN — ASPIRIN 325 MG ORAL TABLET 325 MG: 325 PILL ORAL at 00:24

## 2022-09-12 RX ADMIN — SODIUM CHLORIDE, PRESERVATIVE FREE 10 ML: 5 INJECTION INTRAVENOUS at 00:25

## 2022-09-12 NOTE — ED TRIAGE NOTES
States burning pain in mid chest that does not radiate and woke her up. Rates pain a 4 at this time. Also states nausea, diarrhea and generalized weakness at the same time as the chest pain.

## 2022-09-12 NOTE — ED PROVIDER NOTES
CHEST PAIN - EMERGENCY DEPARTMENT HISTORY AND PHYSICAL EXAM      Date: 9/12/2022  Patient Name: Kerwin Gerardo    History of Presenting Illness     Chief Complaint   Patient presents with    Chest Pain    Diarrhea    Fatigue       History Provided By: Patient    Chief Complaint: Burning in her chest  Duration: 1 to 2 hours Hours  Timing:  Intermittent  Location: Chest  Quality: Burning  Severity: 2 out of 10  Modifying Factors: No exacerbating relieving factors and she has not treated it with anything  Associated Symptoms: denies any other associated signs or symptoms      Additional History (Context): Kerwin Gerardo is a 78 y.o. female with  atrial fibrillation  who presents with burning in her chest that began 1 to 2 hours prior to arrival.  No treatment before she got here. No history of heart disease in the past otherwise. She is on Xarelto for    PCP: Lawyer Wes MD    Current Facility-Administered Medications   Medication Dose Route Frequency Provider Last Rate Last Admin    sodium chloride (NS) flush 5-40 mL  5-40 mL IntraVENous Q8H Alta GAXIOLA MD   10 mL at 09/12/22 0025    sodium chloride (NS) flush 5-40 mL  5-40 mL IntraVENous PRN Luan Ferrer MD         Current Outpatient Medications   Medication Sig Dispense Refill    nitrofurantoin, macrocrystal-monohydrate, (Macrobid) 100 mg capsule Take 1 Capsule by mouth two (2) times a day. 10 Capsule 0    atorvastatin (LIPITOR) 20 mg tablet Take 1 Tablet by mouth daily. 90 Tablet 1    ibandronate (BONIVA) 150 mg tablet TAKE 1 TABLET BY MOUTH ONCE A MONTH 1 Tablet 0    pantoprazole (PROTONIX) 40 mg tablet Take 1 Tablet by mouth daily. 90 Tablet 3    diclofenac EC (VOLTAREN) 50 mg EC tablet Take 1 Tablet by mouth two (2) times a day. 180 Tablet 4    rOPINIRole (REQUIP) 2 mg tablet Take 1 Tablet by mouth daily. 90 Tablet 4    atenoloL (TENORMIN) 50 mg tablet Take  by mouth daily.       calcium citrate 200 mg (950 mg) tablet Take  by mouth two (2) times a day. nicotinic acid (NIACIN) 500 mg tablet Take 500 mg by mouth two (2) times daily (with meals). vitamin E (AQUA GEMS) 400 unit capsule Take  by mouth daily. rivaroxaban (XARELTO) 10 mg tablet Take  by mouth daily. Past History     Past Medical History:  Past Medical History:   Diagnosis Date    Arthritis 7/30/2020    Atrial fibrillation ([address] 7/30/2020    Back ache 7/30/2020    Disorder of bone and articular cartilage 7/30/2020    GERD (gastroesophageal reflux disease) 7/30/2020    Hearing reduced 06/2017    Hypertension     Pure hypercholesterolemia 7/30/2020    Vitamin D deficiency 7/30/2020       Past Surgical History:  Past Surgical History:   Procedure Laterality Date    HX CATARACT REMOVAL  11/2012    HX COLONOSCOPY  01/26/2012    DUe 2022    HX ORTHOPAEDIC  12/01/2018    hernated disc repair    HX ORTHOPAEDIC  02/25/2020    lumbar surgery-L2-5 cage fusion    HX OTHER SURGICAL Bilateral 08/2017    Bilateral Laser Treatment    HX OTHER SURGICAL  01/2021    gum surgery    HX TUBAL LIGATION  1981    LA CARDIAC SURG PROCEDURE UNLIST  2014    a-fib procedure       Family History:  Family History   Problem Relation Age of Onset    Cancer Mother         breast    Breast Cancer Mother     Heart Disease Father     Cancer Brother     Lung Cancer Other     Lung Cancer Other     Lung Cancer Other     Breast Cancer Sister     Heart Disease Brother     Heart Disease Brother     Cancer Sister     Cancer Sister     Cancer Sister     Cancer Brother     Cancer Brother     Cancer Brother     Cancer Brother     Cancer Brother        Social History:  Social History     Tobacco Use    Smoking status: Never    Smokeless tobacco: Never   Vaping Use    Vaping Use: Never used   Substance Use Topics    Alcohol use: Not Currently     Alcohol/week: 0.0 standard drinks    Drug use: Never       Allergies:  No Known Allergies      Review of Systems     Review of Systems   Constitutional: Negative.   Negative for appetite change, chills, fatigue and fever. HENT: Negative. Negative for congestion and sinus pain. Eyes: Negative. Negative for pain and visual disturbance. Respiratory:  Positive for chest tightness. Negative for shortness of breath. Cardiovascular:  Positive for chest pain. Gastrointestinal: Negative. Negative for abdominal pain, diarrhea, nausea and vomiting. Genitourinary: Negative. Negative for difficulty urinating. No discharge   Musculoskeletal: Negative. Negative for arthralgias. Skin: Negative. Negative for rash. Neurological: Negative. Negative for weakness and headaches. Hematological: Negative. Psychiatric/Behavioral: Negative. Negative for agitation. The patient is not nervous/anxious. All other systems reviewed and are negative. Physical Exam     Physical Exam  Vitals and nursing note reviewed. Constitutional:       General: She is not in acute distress. Appearance: She is well-developed. HENT:      Head: Normocephalic and atraumatic. Nose: Nose normal.      Mouth/Throat:      Mouth: Mucous membranes are moist.      Pharynx: Oropharynx is clear. No oropharyngeal exudate. Eyes:      General:         Right eye: No discharge. Left eye: No discharge. Conjunctiva/sclera: Conjunctivae normal.      Pupils: Pupils are equal, round, and reactive to light. Cardiovascular:      Rate and Rhythm: Normal rate and regular rhythm. Chest Wall: PMI is not displaced. No thrill. Heart sounds: Normal heart sounds. No murmur heard. No friction rub. No gallop. Pulmonary:      Effort: Pulmonary effort is normal. No respiratory distress. Breath sounds: Normal breath sounds. No wheezing or rales. Chest:      Chest wall: No tenderness. Abdominal:      General: Bowel sounds are normal. There is no distension. Palpations: Abdomen is soft. There is no mass. Tenderness: There is no abdominal tenderness.  There is no guarding or rebound. Musculoskeletal:         General: Normal range of motion. Cervical back: Normal range of motion and neck supple. Lymphadenopathy:      Cervical: No cervical adenopathy. Skin:     General: Skin is warm and dry. Capillary Refill: Capillary refill takes less than 2 seconds. Findings: No erythema or rash. Neurological:      Mental Status: She is alert and oriented to person, place, and time. Cranial Nerves: No cranial nerve deficit. Coordination: Coordination normal.   Psychiatric:         Mood and Affect: Mood normal.         Behavior: Behavior normal.       Lab and Diagnostic Study Results     Labs -     Recent Results (from the past 12 hour(s))   EKG, 12 LEAD, INITIAL    Collection Time: 09/12/22 12:11 AM   Result Value Ref Range    Ventricular Rate 60 BPM    Atrial Rate 60 BPM    P-R Interval 182 ms    QRS Duration 92 ms    Q-T Interval 434 ms    QTC Calculation (Bezet) 434 ms    Calculated P Axis 61 degrees    Calculated R Axis 42 degrees    Calculated T Axis 50 degrees    Diagnosis       Normal sinus rhythm  Normal ECG  When compared with ECG of 28-JAN-2022 02:21,  Premature ventricular complexes are no longer Present     CBC WITH AUTOMATED DIFF    Collection Time: 09/12/22 12:15 AM   Result Value Ref Range    WBC 4.9 3.6 - 11.0 K/uL    RBC 3.74 (L) 3.80 - 5.20 M/uL    HGB 11.4 (L) 11.5 - 16.0 g/dL    HCT 34.1 (L) 35.0 - 47.0 %    MCV 91.2 80.0 - 99.0 FL    MCH 30.5 26.0 - 34.0 PG    MCHC 33.4 30.0 - 36.5 g/dL    RDW 12.8 11.5 - 14.5 %    PLATELET 650 498 - 212 K/uL    MPV 8.9 8.9 - 12.9 FL    NEUTROPHILS 46 32 - 75 %    LYMPHOCYTES 36 12 - 49 %    MONOCYTES 14 (H) 5 - 13 %    EOSINOPHILS 4 0 - 7 %    BASOPHILS 0 0 - 1 %    IMMATURE GRANULOCYTES 0 0.0 - 0.5 %    ABS. NEUTROPHILS 2.3 1.8 - 8.0 K/UL    ABS. LYMPHOCYTES 1.8 0.8 - 3.5 K/UL    ABS. MONOCYTES 0.7 0.0 - 1.0 K/UL    ABS. EOSINOPHILS 0.2 0.0 - 0.4 K/UL    ABS. BASOPHILS 0.0 0.0 - 0.1 K/UL    ABS. IMM. GRANS. 0.0 0.00 - 0.04 K/UL    DF AUTOMATED     METABOLIC PANEL, COMPREHENSIVE    Collection Time: 09/12/22 12:15 AM   Result Value Ref Range    Sodium 142 136 - 145 mmol/L    Potassium 3.3 (L) 3.5 - 5.1 mmol/L    Chloride 105 97 - 108 mmol/L    CO2 27 21 - 32 mmol/L    Anion gap 10 5 - 15 mmol/L    Glucose 122 (H) 65 - 100 mg/dL    BUN 13 6 - 20 mg/dL    Creatinine 0.83 0.55 - 1.02 mg/dL    BUN/Creatinine ratio 16 12 - 20      GFR est AA >60 >60 ml/min/1.73m2    GFR est non-AA >60 >60 ml/min/1.73m2    Calcium 8.7 8.5 - 10.1 mg/dL    Bilirubin, total 0.6 0.2 - 1.0 mg/dL    AST (SGOT) 27 15 - 37 U/L    ALT (SGPT) 33 12 - 78 U/L    Alk. phosphatase 44 (L) 45 - 117 U/L    Protein, total 7.5 6.4 - 8.2 g/dL    Albumin 3.7 3.5 - 5.0 g/dL    Globulin 3.8 2.0 - 4.0 g/dL    A-G Ratio 1.0 (L) 1.1 - 2.2     MAGNESIUM    Collection Time: 09/12/22 12:15 AM   Result Value Ref Range    Magnesium 1.8 1.6 - 2.4 mg/dL   PROTHROMBIN TIME + INR    Collection Time: 09/12/22 12:15 AM   Result Value Ref Range    Prothrombin time 10.4 9.0 - 11.1 sec    INR 1.0 0.9 - 1.1     TROPONIN-HIGH SENSITIVITY    Collection Time: 09/12/22 12:15 AM   Result Value Ref Range    Troponin-High Sensitivity 9 0 - 51 ng/L       Radiologic Studies -   XR CHEST PORT   Final Result   No acute process. CT Results  (Last 48 hours)      None          CXR Results  (Last 48 hours)                 09/12/22 0035  XR CHEST PORT Final result    Impression:  No acute process. Narrative:  EXAM:  XR CHEST PORT       INDICATION: Chest pain       COMPARISON: 1/28/2022. TECHNIQUE: Portable AP upright chest view at 0031 hours       FINDINGS: The cardiomediastinal contours are stable. The lungs and pleural   spaces are clear. There is no pneumothorax. The bones and upper abdomen are   stable. Medical Decision Making and ED Course   - I am the first and primary provider for this patient.     - I reviewed the vital signs, available nursing notes, past medical history, past surgical history, family history and social history. - Initial assessment performed. The patients presenting problems have been discussed, and the staff are in agreement with the care plan formulated and outlined with them. I have encouraged them to ask questions as they arise throughout their visit. Vital Signs-Reviewed the patient's vital signs. Patient Vitals for the past 12 hrs:   Temp Pulse Resp BP SpO2   09/12/22 0117 -- 64 16 (!) 166/90 96 %   09/12/22 0008 97.7 °F (36.5 °C) 67 16 (!) 191/79 99 %     EKG interpretation: Ventricular rate 60 bpm,  ms, QRS duration 92 ms, QTC 4 and 34 ms. Interpretation: Normal sinus rhythm normal EKG. Records Reviewed: Nursing Notes    ED Course:          Provider Notes/Medical Decision Making:   HEART SCORE:   History: Slightly or Non-suspicious  ECG: Normal  Age: Greater than or equal to 65 years  Risk Factors: 1 or 2 risk factors  Troponin: Less than or equal to normal limit   HEART Score Total : 3     Management   Scores 0-3: 0.9-1.7% risk of adverse cardiac event. In the HEART Score study, these patients were discharged (0.99% in the retrospective study, 1.7% in the prospective study)   Scores 4-6: 12-16.6% risk of adverse cardiac event. In the HEART Score study, these patients were admitted to the hospital. (11.6% retrospective, 16.6% prospective)   Scores ? 7: 50-65% risk of adverse cardiac event. In the HEART Score study, these patients were candidates for early invasive measures. (65.2% retrospective, 50.1% prospective)   A MACE (Major Adverse Cardiac Event) was defined as all-cause mortality, myocardial infarction, or coronary revascularization. Original/Primary Reference  Shay MUSTAFA, Ida Bullock. Chest pain in the emergency room: value of the HEART score. Neth Heart J. 2008;16(6):191-6.    Validation  Real JACOBO, Shay MUSTAFA, Ida NEAL, et al. A prospective validation of the HEART score for chest pain patients at the emergency department. Int J Cardiol. 2013;168(3):2153-8. Houston BE, Shay AJ, Chente Rodriguez, et al. Chest pain in the emergency room: a multicenter validation of the HEART Score. Crit Pathw Cardiol. 2010;9(3):164-9. Jaya NIMA, Joseph RF, Isiah BC, et al. The HEART Pathway Randomized Controlled Trial One Year Outcomes. [facility] 2018; Medical Decision Making:   Patient appears to be low risk for an acute coronary syndrome. She is feeling much better so she would like to go home does not want further testing at this time. MDM       Consultations     Consultations: - NONE      Procedures and [address] Trevino MD  PROCEDURES   Procedures    Disposition     Disposition: Condition stable    Discharged    Remove if not discharged  DISCHARGE PLAN:  1. Current Discharge Medication List        CONTINUE these medications which have NOT CHANGED    Details   nitrofurantoin, macrocrystal-monohydrate, (Macrobid) 100 mg capsule Take 1 Capsule by mouth two (2) times a day. Qty: 10 Capsule, Refills: 0    Associated Diagnoses: Cystitis without hematuria; Dysuria      atorvastatin (LIPITOR) 20 mg tablet Take 1 Tablet by mouth daily. Qty: 90 Tablet, Refills: 1    Associated Diagnoses: Pure hypercholesterolemia      ibandronate (BONIVA) 150 mg tablet TAKE 1 TABLET BY MOUTH ONCE A MONTH  Qty: 1 Tablet, Refills: 0      pantoprazole (PROTONIX) 40 mg tablet Take 1 Tablet by mouth daily. Qty: 90 Tablet, Refills: 3    Associated Diagnoses: Gastroesophageal reflux disease without esophagitis      diclofenac EC (VOLTAREN) 50 mg EC tablet Take 1 Tablet by mouth two (2) times a day. Qty: 180 Tablet, Refills: 4      rOPINIRole (REQUIP) 2 mg tablet Take 1 Tablet by mouth daily. Qty: 90 Tablet, Refills: 4      atenoloL (TENORMIN) 50 mg tablet Take  by mouth daily. calcium citrate 200 mg (950 mg) tablet Take  by mouth two (2) times a day.       nicotinic acid (NIACIN) 500 mg tablet Take 500 mg by mouth two (2) times daily (with meals). vitamin E (AQUA GEMS) 400 unit capsule Take  by mouth daily. rivaroxaban (XARELTO) 10 mg tablet Take  by mouth daily. 2.   Follow-up Information       Follow up With Specialties Details Why Contact Info    Sy Mantilla MD Family Medicine Call in 2 days  [address] [facility]  [phone]            3. Return to ED if worse   4. Current Discharge Medication List          Diagnosis     Clinical Impression:   1. Acute chest pain        Attestations:    David Elias MD    Please note that this dictation was completed with [employer], the computer voice recognition software. Quite often unanticipated grammatical, syntax, homophones, and other interpretive errors are inadvertently transcribed by the computer software. Please disregard these errors. Please excuse any errors that have escaped final proofreading. Thank you.

## 2022-09-13 ENCOUNTER — PATIENT OUTREACH (OUTPATIENT)
Dept: CASE MANAGEMENT | Age: 77
End: 2022-09-13

## 2022-09-13 NOTE — PROGRESS NOTES
09/13/22  10:59 AM  Ambulatory Care Management Note    Date/Time:  9/13/2022 10:59 AM    This patient was received as a referral from [facility]. Ambulatory Care Manager outreached to patient today to offer care management services. Introduction to self and role of care manager provided. Patient accepted care management services at this time. Follow up call scheduled at this time. Patient has Ambulatory Care Manager's contact number for for any questions or concerns.

## 2022-09-16 ENCOUNTER — OFFICE VISIT (OUTPATIENT)
Dept: NEUROLOGY | Age: 77
End: 2022-09-16
Payer: MEDICARE

## 2022-09-16 DIAGNOSIS — R29.898 RIGHT LEG WEAKNESS: Primary | ICD-10-CM

## 2022-09-16 PROCEDURE — 95909 NRV CNDJ TST 5-6 STUDIES: CPT | Performed by: PSYCHIATRY & NEUROLOGY

## 2022-09-16 PROCEDURE — 95886 MUSC TEST DONE W/N TEST COMP: CPT | Performed by: PSYCHIATRY & NEUROLOGY

## 2022-09-16 NOTE — PROCEDURES
EMG/ NCS Report  [facility]  - [facility]  [address] [facility], [address] [place], [address]   Ph: [phone]   FAX: [phone]/ [phone]  Test Date:  2019      Test Date:  2022    Patient: Nj Hudson : 1945 Physician: Miki Rowe MD   Sex: Female Height: ' \" Ref Phys: Elda Bailey MD   ID#: [id number] Weight:  lbs. Technician: Marcelino Norman     Patient History / Exam:  In 2019, was complaining of R lower back pain and had surgery c/o ortho surgeon, after which noted persistent R leg weakness. (-) numbness    Patient is coming for radiculopathy evaluation. EMG & NCV Findings:  Evaluation of the right Fibular motor nerve showed normal distal onset latency (3.5 ms), normal amplitude (4.1 mV), normal conduction velocity (B Fib-Ankle, 46 m/s), and normal conduction velocity (Poplt-B Fib, 59 m/s). The right tibial motor nerve showed normal distal onset latency (3.4 ms), normal amplitude (14.4 mV), and normal conduction velocity (Knee-Ankle, 43 m/s). The right Sup Fibular sensory nerve showed normal distal peak latency (2.8 ms) and normal amplitude (4.7 µV). All F Wave latencies were within normal limits. H-reflex studies indicate that the right tibial H-reflex has no response. Needle evaluation of the right extensor digitorum brevis muscle showed diminished recruitment, Incr Duration, increased motor unit amplitude, and moderately increased polyphasic potentials. The right anterior tibialis, the right vastus lateralis, and the right rectus femoris muscles showed diminished recruitment, Incr Duration, and increased motor unit amplitude. The right gluteus medius muscle showed diminished recruitment and Incr Duration. All remaining muscles (as indicated in the following table) showed no evidence of electrical instability.         Impression:  ABNORMAL    Extensive electrodiagnostic examination of the right lower extremity shows the followin) Intact sensory responses  2) Absent H reflex  2) Chronic, without active, motor axon loss changes in muscles innervated by right L4-S1 roots/segments. These may represent residual findings related to history of prior lower back surgery, or due to chronic, right motor radiculopathy, affecting said levels. Recommend radiological correlation.            Rica Carrington MD  Diplomate, American Board of Psychiatry and Neurology  Diplomate, Neuromuscular Medicine  Diplomate, American Board of Electrodiagnostic Medicine  Director, [address] [place] [address] Accredited Laboratory with Exemplary Status            Nerve Conduction Studies  Anti Sensory Summary Table     Stim Site NR Peak (ms) Norm Peak (ms) P-T Amp (µV) Norm P-T Amp Site1 Site2 Dist (cm)   Right Sup Fibular Anti Sensory (Lat ankle)  30 °C   Lower leg    2.8 <4.6 4.7 >4 Lower leg Lat ankle 10.0   Right Sural Anti Sensory (Lat Mall)  30.8 °C   Site 2    3.7  5.3         Motor Summary Table     Stim Site NR Onset (ms) Norm Onset (ms) O-P Amp (mV) Norm O-P Amp Amp (Prev) (%) Site1 Site2 Dist (cm) Juan (m/s) Norm Juan (m/s)   Right Fibular Motor (Ext Dig Brev)  32.1 °C   Ankle    3.5 <6.5 4.1 >1.1 100.0 Ankle Ext Dig Brev 8.0     B Fib    9.8  4.0  97.6 B Fib Ankle 29.0 46 >38   Poplt    11.5  3.7  92.5 Poplt B Fib 10.0 59 >42   Right Tibial Motor (Abd Almanza Brev)  30.7 °C   Ankle    3.4 <6.1 14.4 >1.1 100.0 Ankle Abd Almanza Brev 8.0     Knee    11.8  10.4  72.2 Knee Ankle 36.0 43 >39     F Wave Studies     NR F-Lat (ms) Lat Norm (ms) L-R F-Lat (ms) L-R Lat Norm   Right Tibial (Mrkrs) (Abd Hallucis)  30.4 °C      50.29 <56  <5.7     H Reflex Studies     NR H-Lat (ms) L-R H-Lat (ms) L-R Lat Norm   Right Tibial (Gastroc)  30.2 °C   NR   <2.0     EMG     Side Muscle Nerve Root Ins Act Fibs Psw Recrt Duration Amp Poly Comment   Right Ext Dig Brev Dp Br Peron L5, S1 Nml Nml Nml Reduced Incr Incr 2+    Right AbdHallucis MedPlantar S1-2 Nml Nml Nml Nml Nml Nml Nml    Right AntTibialis Dp Br Peron L4-5 Nml Nml Nml Reduced Incr Incr Nml    Right MedGastroc Tibial S1-2 Nml Nml Nml Nml Nml Nml Nml    Right VastusLat Femoral L2-4 Nml Nml Nml Reduced Incr Incr Nml    Right RectFemoris Femoral L2-4 Nml Nml Nml Reduced Incr Incr Nml    Right GluteusMed SupGluteal L4-S1 Nml Nml Nml Reduced Incr Nml Nml                Nerve Conduction Studies  Anti Sensory Left/Right Comparison     Stim Site L Lat (ms) R Lat (ms) L-R Lat (ms) L Amp (µV) R Amp (µV) L-R Amp (%) Site1 Site2 L Juan (m/s) R Juan (m/s) L-R Juan (m/s)   Sup Fibular Anti Sensory (Lat ankle)  30 °C   Lower leg  2.5   4.7  Lower leg Lat ankle  40    Sural Anti Sensory (Lat Mall)  30.8 °C   Site 2  2.9   5.3           Motor Left/Right Comparison     Stim Site L Lat (ms) R Lat (ms) L-R Lat (ms) L Amp (mV) R Amp (mV) L-R Amp (%) Site1 Site2 L Juan (m/s) R Juan (m/s) L-R Juan (m/s)   Fibular Motor (Ext Dig Brev)  32.1 °C   Ankle  3.5   4.1  Ankle Ext Dig Brev      B Fib  9.8   4.0  B Fib Ankle  46    Poplt  11.5   3.7  Poplt B Fib  59    Tibial Motor (Abd Almanza Brev)  30.7 °C   Ankle  3.4   14.4  Ankle Abd Almanza Brev      Knee  11.8   10.4  Knee Ankle  43          Waveforms:

## 2022-09-21 ENCOUNTER — PATIENT OUTREACH (OUTPATIENT)
Dept: CASE MANAGEMENT | Age: 77
End: 2022-09-21

## 2022-09-21 NOTE — PROGRESS NOTES
Ambulatory Care Management Note    Date/Time:  9/21/2022 4:22 PM    This Ambulatory Care Manager (ACM) reviewed and updated the following screenings during this call; general assessment, disease specific assessment , self management assessment, SDOH assessments, ACP assessment and note, and medication reconciliation     Patient's challenges to self-management identified:   functional physical ability, lack of knowledge about disease, level of motivation, medical condition, medication management, multi health system providers, and support system      Medication Management:  good adherence and good understanding  Current Outpatient Medications   Medication Sig    atorvastatin (LIPITOR) 20 mg tablet Take 1 Tablet by mouth daily. ibandronate (BONIVA) 150 mg tablet TAKE 1 TABLET BY MOUTH ONCE A MONTH    pantoprazole (PROTONIX) 40 mg tablet Take 1 Tablet by mouth daily. diclofenac EC (VOLTAREN) 50 mg EC tablet Take 1 Tablet by mouth two (2) times a day. rOPINIRole (REQUIP) 2 mg tablet Take 1 Tablet by mouth daily. atenoloL (TENORMIN) 50 mg tablet Take  by mouth daily. calcium citrate 200 mg (950 mg) tablet Take  by mouth two (2) times a day. nicotinic acid (NIACIN) 500 mg tablet Take 500 mg by mouth two (2) times daily (with meals). vitamin E (AQUA GEMS) 400 unit capsule Take  by mouth daily. rivaroxaban (XARELTO) 10 mg tablet Take  by mouth daily. nitrofurantoin, macrocrystal-monohydrate, (Macrobid) 100 mg capsule Take 1 Capsule by mouth two (2) times a day. (Patient not taking: Reported on 9/21/2022)     No current facility-administered medications for this visit. Advance Care Planning:   Does patient have an Advance Directive:  yes; reviewed and current patient will bring a copy in to PCP to have put on chart.     [employer], Referrals, and Durable Medical Equipment: none      Health Maintenance Due   Topic Date Due    Hepatitis C Screening  Never done    Colorectal Cancer Screening Combo  Never done    Shingrix Vaccine Age 50> (1 of 2) Never done    DTaP/Tdap/Td series (1 - Tdap) 01/02/2008    Pneumococcal 65+ years (2 - PCV) 03/29/2015    COVID-19 Vaccine (4 - Booster for Pfizer series) 02/06/2022    Flu Vaccine (1) 08/01/2022    Medicare Yearly Exam  08/19/2022     Health Maintenance Reviewed:   Patient is up to date on shingles and covid. Plans to get flu vaccine in November. Says no longer needs to get colonoscopies. Will talk to PCP at next appt re:  hep c screen, tdap series, and medicare yearly exam.    Patient was asked to consider health care goals that they would like to focus on with this ACM. ACM will follow up with patient to discuss goals and establish care plan in the next 7-14 days.        PCP/Specialist follow up:   Future Appointments   Date Time Provider Mac Mart   11/29/2022 11:30 AM Pascual Zhagn MD [facility] BS AMB

## 2022-09-27 ENCOUNTER — TELEPHONE (OUTPATIENT)
Dept: PRIMARY CARE CLINIC | Age: 77
End: 2022-09-27

## 2022-09-27 NOTE — TELEPHONE ENCOUNTER
Pt received an e-mail from her pharmacy regarding her pantoprazole. The pharmacy is getting ready to fill it but are waiting on info from pcp.

## 2022-10-11 ENCOUNTER — PATIENT OUTREACH (OUTPATIENT)
Dept: CASE MANAGEMENT | Age: 77
End: 2022-10-11

## 2022-10-11 DIAGNOSIS — K21.9 GASTROESOPHAGEAL REFLUX DISEASE WITHOUT ESOPHAGITIS: ICD-10-CM

## 2022-10-11 NOTE — PROGRESS NOTES
Patient has graduated from the Complex Case Management  program on 10/11/22. Patient/family has the ability to self-manage at this time. She reports no further follow up needed at this time. No further Ambulatory Care Manager follow up scheduled. Goals Addressed    None         Patient has Ambulatory Care Manager's contact information for any further questions, concerns, or needs.   Patients upcoming visits:    Future Appointments   Date Time Provider Mac Mart   11/29/2022 11:30 AM Danielle Zhang MD [facility] BS AMB

## 2022-10-13 RX ORDER — ROPINIROLE 2 MG/1
2 TABLET, FILM COATED ORAL DAILY
Qty: 90 TABLET | Refills: 4 | Status: SHIPPED | OUTPATIENT
Start: 2022-10-13

## 2022-10-13 RX ORDER — PANTOPRAZOLE SODIUM 40 MG/1
40 TABLET, DELAYED RELEASE ORAL DAILY
Qty: 90 TABLET | Refills: 3 | Status: SHIPPED | OUTPATIENT
Start: 2022-10-13

## 2022-11-29 ENCOUNTER — OFFICE VISIT (OUTPATIENT)
Dept: NEUROLOGY | Age: 77
End: 2022-11-29
Payer: MEDICARE

## 2022-11-29 VITALS
SYSTOLIC BLOOD PRESSURE: 136 MMHG | OXYGEN SATURATION: 98 % | DIASTOLIC BLOOD PRESSURE: 88 MMHG | RESPIRATION RATE: 14 BRPM | HEART RATE: 60 BPM

## 2022-11-29 DIAGNOSIS — S34.8XXA: Primary | ICD-10-CM

## 2022-11-29 PROCEDURE — G8399 PT W/DXA RESULTS DOCUMENT: HCPCS | Performed by: PSYCHIATRY & NEUROLOGY

## 2022-11-29 PROCEDURE — G8417 CALC BMI ABV UP PARAM F/U: HCPCS | Performed by: PSYCHIATRY & NEUROLOGY

## 2022-11-29 PROCEDURE — G8536 NO DOC ELDER MAL SCRN: HCPCS | Performed by: PSYCHIATRY & NEUROLOGY

## 2022-11-29 PROCEDURE — G8510 SCR DEP NEG, NO PLAN REQD: HCPCS | Performed by: PSYCHIATRY & NEUROLOGY

## 2022-11-29 PROCEDURE — 3017F COLORECTAL CA SCREEN DOC REV: CPT | Performed by: PSYCHIATRY & NEUROLOGY

## 2022-11-29 PROCEDURE — 99214 OFFICE O/P EST MOD 30 MIN: CPT | Performed by: PSYCHIATRY & NEUROLOGY

## 2022-11-29 PROCEDURE — 1123F ACP DISCUSS/DSCN MKR DOCD: CPT | Performed by: PSYCHIATRY & NEUROLOGY

## 2022-11-29 PROCEDURE — 1090F PRES/ABSN URINE INCON ASSESS: CPT | Performed by: PSYCHIATRY & NEUROLOGY

## 2022-11-29 PROCEDURE — G8427 DOCREV CUR MEDS BY ELIG CLIN: HCPCS | Performed by: PSYCHIATRY & NEUROLOGY

## 2022-11-29 PROCEDURE — 1101F PT FALLS ASSESS-DOCD LE1/YR: CPT | Performed by: PSYCHIATRY & NEUROLOGY

## 2022-11-29 NOTE — PROGRESS NOTES
[facility] and [address] at [facility] at [address] [facility], [address] [address], [address] [place] [address]   [phone]              Chief Complaint   Patient presents with    Results     Emg, dop, mri, no change in leg weakness     Current Outpatient Medications   Medication Sig Dispense Refill    pantoprazole (PROTONIX) 40 mg tablet Take 1 Tablet by mouth daily. 90 Tablet 3    rOPINIRole (REQUIP) 2 mg tablet Take 1 Tablet by mouth daily. 90 Tablet 4    rivaroxaban (XARELTO) 10 mg tablet Take 1 Tablet by mouth daily. 90 Tablet 1    atorvastatin (LIPITOR) 20 mg tablet Take 1 Tablet by mouth daily. 90 Tablet 1    ibandronate (BONIVA) 150 mg tablet TAKE 1 TABLET BY MOUTH ONCE A MONTH 1 Tablet 0    diclofenac EC (VOLTAREN) 50 mg EC tablet Take 1 Tablet by mouth two (2) times a day. 180 Tablet 4    atenoloL (TENORMIN) 50 mg tablet Take  by mouth daily. calcium citrate 200 mg (950 mg) tablet Take  by mouth two (2) times a day. nicotinic acid (NIACIN) 500 mg tablet Take 500 mg by mouth two (2) times daily (with meals). vitamin E (AQUA GEMS) 400 unit capsule Take  by mouth daily. No Known Allergies  Social History     Tobacco Use    Smoking status: Never    Smokeless tobacco: Never   Vaping Use    Vaping Use: Never used   Substance Use Topics    Alcohol use: Not Currently     Alcohol/week: 0.0 standard drinks    Drug use: Never     44-year-old lady returns today for follow-up after her recent initial consultation where she came in complaining of right-sided leg weakness since lumbar surgery back in 2020.   We discussed at that time that this may be secondary to previous nerve root compression that was just not going to get better secondary to being injured prior to her surgery versus a potential central process given reflexes  I sent her for several tests  MRI of the brain is personally reviewed today. 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 She did undergo L2-L5 lateral interbody fusion and pedicle screw instrumentation. Today she reports she continues to have weakness of that leg and is unstable. She has been reluctant to use a cane if there was a chance that things should get better. Examination  Visit Vitals  /88 (BP 1 Location: Left upper arm, BP Patient Position: Sitting, BP Cuff Size: Adult)   Pulse 60   Resp 14   SpO2 98%     She is a very pleasant lady. She is awake alert and oriented. Speech and language normal.  Cognition normal.  No ataxia. Impression/Plan  We discussed the reassuring finding of her brain MRI and carotid Doppler and also discussed the less reassuring findings of her EMG. Discussed that the nerve roots as noted to have been permanently damaged by her previous degenerative change in the back and given the time which is transpired from her surgery she is not going to get that back and this leg weakness is something with which she is going to have to deal.  We did discuss using a cane for safety. Discussed physical therapy but I would not waste her time in co-pay because I do not think that is going to make things better because there is permanent nerve damage. Follow-up as needed    Bisi Espino MD    30 minutes spent today in study review, face-to-face interaction and discussion as well as documentation    This note was created using voice recognition software. Despite editing, there may be syntax errors.

## 2022-12-02 ENCOUNTER — PATIENT OUTREACH (OUTPATIENT)
Dept: CASE MANAGEMENT | Age: 77
End: 2022-12-02

## 2023-01-04 RX ORDER — IBANDRONATE SODIUM 150 MG/1
TABLET, FILM COATED ORAL
Qty: 1 TABLET | Refills: 5 | Status: SHIPPED | OUTPATIENT
Start: 2023-01-04

## 2023-01-12 ENCOUNTER — HOSPITAL ENCOUNTER (EMERGENCY)
Age: 78
Discharge: HOME | End: 2023-01-12
Payer: MEDICARE

## 2023-01-12 VITALS
RESPIRATION RATE: 18 BRPM | WEIGHT: 180 LBS | HEART RATE: 62 BPM | DIASTOLIC BLOOD PRESSURE: 78 MMHG | TEMPERATURE: 97.7 F | HEIGHT: 66 IN | OXYGEN SATURATION: 100 % | BODY MASS INDEX: 28.93 KG/M2 | SYSTOLIC BLOOD PRESSURE: 159 MMHG

## 2023-01-12 DIAGNOSIS — T14.8XXA MUSCLE STRAIN: Primary | ICD-10-CM

## 2023-01-12 PROCEDURE — 99283 EMERGENCY DEPT VISIT LOW MDM: CPT

## 2023-01-12 PROCEDURE — 74011250637 HC RX REV CODE- 250/637: Performed by: NURSE PRACTITIONER

## 2023-01-12 PROCEDURE — 74011636637 HC RX REV CODE- 636/637: Performed by: NURSE PRACTITIONER

## 2023-01-12 PROCEDURE — 74011000250 HC RX REV CODE- 250: Performed by: NURSE PRACTITIONER

## 2023-01-12 RX ORDER — METHYLPREDNISOLONE 4 MG/1
TABLET ORAL
Qty: 1 DOSE PACK | Refills: 0 | OUTPATIENT
Start: 2023-01-12 | End: 2023-01-16

## 2023-01-12 RX ORDER — HYDROCODONE BITARTRATE AND ACETAMINOPHEN 5; 325 MG/1; MG/1
1 TABLET ORAL ONCE
Status: COMPLETED | OUTPATIENT
Start: 2023-01-12 | End: 2023-01-12

## 2023-01-12 RX ORDER — PREDNISONE 20 MG/1
60 TABLET ORAL
Status: COMPLETED | OUTPATIENT
Start: 2023-01-12 | End: 2023-01-12

## 2023-01-12 RX ORDER — LIDOCAINE 4 G/100G
1 PATCH TOPICAL EVERY 24 HOURS
Status: DISCONTINUED | OUTPATIENT
Start: 2023-01-12 | End: 2023-01-12 | Stop reason: HOSPADM

## 2023-01-12 RX ORDER — LIDOCAINE 4 G/100G
PATCH TOPICAL
Qty: 10 EACH | Refills: 0 | Status: SHIPPED | OUTPATIENT
Start: 2023-01-12

## 2023-01-12 RX ORDER — METHOCARBAMOL 500 MG/1
500 TABLET, FILM COATED ORAL 3 TIMES DAILY
Qty: 10 TABLET | Refills: 0 | OUTPATIENT
Start: 2023-01-12 | End: 2023-01-16

## 2023-01-12 RX ADMIN — HYDROCODONE BITARTRATE AND ACETAMINOPHEN 1 TABLET: 5; 325 TABLET ORAL at 13:25

## 2023-01-12 RX ADMIN — PREDNISONE 60 MG: 20 TABLET ORAL at 13:25

## 2023-01-12 NOTE — ED PROVIDER NOTES
EMERGENCY DEPARTMENT HISTORY AND PHYSICAL EXAM      Date: 1/12/2023  Patient Name: Suyapa Shen    History of Presenting Illness     Chief Complaint   Patient presents with    Muscle Pain    Neck Pain       History Provided By: Patient    HPI: Suyapa Shen, 78 y.o. female OA, GERD<     There are no other complaints, changes, or physical findings at this time.     Past History     Past Medical History:  Past Medical History:   Diagnosis Date    Arthritis 7/30/2020    Atrial fibrillation ([address] 7/30/2020    Back ache 7/30/2020    Disorder of bone and articular cartilage 7/30/2020    GERD (gastroesophageal reflux disease) 7/30/2020    Hearing reduced 06/2017    Hypertension     Pure hypercholesterolemia 7/30/2020    Vitamin D deficiency 7/30/2020       Past Surgical History:  Past Surgical History:   Procedure Laterality Date    HX CATARACT REMOVAL  11/2012    HX COLONOSCOPY  01/26/2012    DUe 2022    HX ORTHOPAEDIC  12/01/2018    hernated disc repair    HX ORTHOPAEDIC  02/25/2020    lumbar surgery-L2-5 cage fusion    HX OTHER SURGICAL Bilateral 08/2017    Bilateral Laser Treatment    HX OTHER SURGICAL  01/2021    gum surgery    HX TUBAL LIGATION  1981    [place] CARDIAC SURG PROCEDURE UNLIST  2014    a-fib procedure       Family History:  Family History   Problem Relation Age of Onset    Cancer Mother         breast    Breast Cancer Mother     Heart Disease Father     Cancer Brother     Lung Cancer Other     Lung Cancer Other     Lung Cancer Other     Breast Cancer Sister     Heart Disease Brother     Heart Disease Brother     Cancer Sister     Cancer Sister     Cancer Sister     Cancer Brother     Cancer Brother     Cancer Brother     Cancer Brother     Cancer Brother        Social History:  Social History     Tobacco Use    Smoking status: Never    Smokeless tobacco: Never   Vaping Use    Vaping Use: Never used   Substance Use Topics    Alcohol use: Not Currently     Alcohol/week: 0.0 standard drinks    Drug use: Never Allergies:  No Known Allergies    PCP: Sameer Tan MD    No current facility-administered medications on file prior to encounter. Current Outpatient Medications on File Prior to Encounter   Medication Sig Dispense Refill    ibandronate (BONIVA) 150 mg tablet TAKE 1 TABLET BY MOUTH ONCE A MONTH 1 Tablet 5    pantoprazole (PROTONIX) 40 mg tablet Take 1 Tablet by mouth daily. 90 Tablet 3    rOPINIRole (REQUIP) 2 mg tablet Take 1 Tablet by mouth daily. 90 Tablet 4    rivaroxaban (XARELTO) 10 mg tablet Take 1 Tablet by mouth daily. 90 Tablet 1    atorvastatin (LIPITOR) 20 mg tablet Take 1 Tablet by mouth daily. 90 Tablet 1    diclofenac EC (VOLTAREN) 50 mg EC tablet Take 1 Tablet by mouth two (2) times a day. 180 Tablet 4    atenoloL (TENORMIN) 50 mg tablet Take  by mouth daily. calcium citrate 200 mg (950 mg) tablet Take  by mouth two (2) times a day. nicotinic acid (NIACIN) 500 mg tablet Take 500 mg by mouth two (2) times daily (with meals). vitamin E (AQUA GEMS) 400 unit capsule Take  by mouth daily. Review of Systems   Review of Systems  ***  Physical Exam   Physical Exam  ***  Lab and Diagnostic Study Results   Labs -   No results found for this or any previous visit (from the past 12 hour(s)). Radiologic Studies -   @lastxrresult@  CT Results  (Last 48 hours)      None          CXR Results  (Last 48 hours)      None            Medical Decision Making and ED Course   Differential Diagnosis & Medical Decision Making Provider Note:   ***    - I am the first provider for this patient. I reviewed the vital signs, available nursing notes, past medical history, past surgical history, family history and social history. The patients presenting problems have been discussed, and they are in agreement with the care plan formulated and outlined with them. I have encouraged them to ask questions as they arise throughout their visit.     Vital Signs-Reviewed the patient's vital signs. Patient Vitals for the past 12 hrs:   Temp Pulse Resp BP SpO2   23 1243 97.7 °F (36.5 °C) 62 18 (!) 159/78 100 %       [facility]:        [facility]  Procedures   Performed by: Shima Almanza, NP  Procedures  ***    Disposition   Disposition: {ED Disposition:75102}    DISCHARGE PLAN:  1. Current Discharge Medication List        CONTINUE these medications which have NOT CHANGED    Details   ibandronate (BONIVA) 150 mg tablet TAKE 1 TABLET BY MOUTH ONCE A MONTH  Qty: 1 Tablet, Refills: 5      pantoprazole (PROTONIX) 40 mg tablet Take 1 Tablet by mouth daily. Qty: 90 Tablet, Refills: 3    Associated Diagnoses: Gastroesophageal reflux disease without esophagitis      rOPINIRole (REQUIP) 2 mg tablet Take 1 Tablet by mouth daily. Qty: 90 Tablet, Refills: 4      rivaroxaban (XARELTO) 10 mg tablet Take 1 Tablet by mouth daily. Qty: 90 Tablet, Refills: 1      atorvastatin (LIPITOR) 20 mg tablet Take 1 Tablet by mouth daily. Qty: 90 Tablet, Refills: 1    Associated Diagnoses: Pure hypercholesterolemia      diclofenac EC (VOLTAREN) 50 mg EC tablet Take 1 Tablet by mouth two (2) times a day. Qty: 180 Tablet, Refills: 4      atenoloL (TENORMIN) 50 mg tablet Take  by mouth daily. calcium citrate 200 mg (950 mg) tablet Take  by mouth two (2) times a day. nicotinic acid (NIACIN) 500 mg tablet Take 500 mg by mouth two (2) times daily (with meals). vitamin E (AQUA GEMS) 400 unit capsule Take  by mouth daily. 2.   Follow-up Information       Follow up With Specialties Details Why Contact Misbah Cleveland MD [facility] Medicine Schedule an appointment as soon as possible for a visit in 1 week If symptoms worsen, As needed [address] [place] [address]  [phone]            3. Return to ED if worse   4. Current Discharge Medication List        *** Remove if admitted/transferred    Diagnosis/Clinical Impression     Clinical Impression:   1.  Muscle strain Attestations: Alison Arnett NP, am the primary clinician of record. Please note that this dictation was completed with [employer], the computer voice recognition software. Quite often unanticipated grammatical, syntax, homophones, and other interpretive errors are inadvertently transcribed by the computer software. Please disregard these errors. Please excuse any errors that have escaped final proofreading. Thank you. Disposition   Disposition: DC- Adult Discharges: All of the diagnostic tests were reviewed and questions answered. Diagnosis, care plan and treatment options were discussed. The patient understands the instructions and will follow up as directed. The patients results have been reviewed with them. They have been counseled regarding their diagnosis. The patient verbally convey understanding and agreement of the signs, symptoms, diagnosis, treatment and prognosis and additionally agrees to follow up as recommended with their PCP in 24 - 48 hours. They also agree with the care-plan and convey that all of their questions have been answered. I have also put together some discharge instructions for them that include: 1) educational information regarding their diagnosis, 2) how to care for their diagnosis at home, as well a 3) list of reasons why they would want to return to the ED prior to their follow-up appointment, should their condition change. DISCHARGE PLAN:  1. Current Discharge Medication List        CONTINUE these medications which have NOT CHANGED    Details   ibandronate (BONIVA) 150 mg tablet TAKE 1 TABLET BY MOUTH ONCE A MONTH  Qty: 1 Tablet, Refills: 5      pantoprazole (PROTONIX) 40 mg tablet Take 1 Tablet by mouth daily. Qty: 90 Tablet, Refills: 3    Associated Diagnoses: Gastroesophageal reflux disease without esophagitis      rOPINIRole (REQUIP) 2 mg tablet Take 1 Tablet by mouth daily. Qty: 90 Tablet, Refills: 4      rivaroxaban (XARELTO) 10 mg tablet Take 1 Tablet by mouth daily. Qty: 90 Tablet, Refills: 1      atorvastatin (LIPITOR) 20 mg tablet Take 1 Tablet by mouth daily. Qty: 90 Tablet, Refills: 1    Associated Diagnoses: Pure hypercholesterolemia      diclofenac EC (VOLTAREN) 50 mg EC tablet Take 1 Tablet by mouth two (2) times a day. Qty: 180 Tablet, Refills: 4      atenoloL (TENORMIN) 50 mg tablet Take  by mouth daily.       calcium citrate 200 mg (950 mg) tablet Take  by mouth two (2) times a day. nicotinic acid (NIACIN) 500 mg tablet Take 500 mg by mouth two (2) times daily (with meals). vitamin E (AQUA GEMS) 400 unit capsule Take  by mouth daily. 2.   Follow-up Information       Follow up With Specialties Details Why Contact Pk Bush MD [facility] Medicine Schedule an appointment as soon as possible for a visit in 1 week If symptoms worsen, As needed [address] [place] [address]  [phone]            3. Return to ED if worse   4. Current Discharge Medication List          Diagnosis/Clinical Impression     Clinical Impression:   1. Muscle strain        Attestations: Tangela Costa NP, am the primary clinician of record. Please note that this dictation was completed with [employer], the [employer] voice recognition software. Quite often unanticipated grammatical, syntax, homophones, and other interpretive errors are inadvertently transcribed by the computer software. Please disregard these errors. Please excuse any errors that have escaped final proofreading. Thank you.

## 2023-01-16 ENCOUNTER — APPOINTMENT (OUTPATIENT)
Dept: CT IMAGING | Age: 78
End: 2023-01-16
Attending: EMERGENCY MEDICINE
Payer: MEDICARE

## 2023-01-16 ENCOUNTER — APPOINTMENT (OUTPATIENT)
Dept: GENERAL RADIOLOGY | Age: 78
End: 2023-01-16
Attending: EMERGENCY MEDICINE
Payer: MEDICARE

## 2023-01-16 ENCOUNTER — HOSPITAL ENCOUNTER (EMERGENCY)
Age: 78
Discharge: HOME | End: 2023-01-16
Attending: EMERGENCY MEDICINE
Payer: MEDICARE

## 2023-01-16 VITALS
RESPIRATION RATE: 18 BRPM | HEART RATE: 65 BPM | SYSTOLIC BLOOD PRESSURE: 176 MMHG | TEMPERATURE: 98.1 F | OXYGEN SATURATION: 96 % | BODY MASS INDEX: 28.93 KG/M2 | WEIGHT: 180 LBS | HEIGHT: 66 IN | DIASTOLIC BLOOD PRESSURE: 76 MMHG

## 2023-01-16 DIAGNOSIS — M47.812 OSTEOARTHRITIS OF CERVICAL SPINE, UNSPECIFIED SPINAL OSTEOARTHRITIS COMPLICATION STATUS: ICD-10-CM

## 2023-01-16 DIAGNOSIS — S46.811D STRAIN OF LEVATOR SCAPULAE MUSCLE, RIGHT, SUBSEQUENT ENCOUNTER: Primary | ICD-10-CM

## 2023-01-16 DIAGNOSIS — M11.00 CHONDROCALCINOSIS DUE TO CALCIUM HYDROXYAPATITE CRYSTALS: ICD-10-CM

## 2023-01-16 PROCEDURE — 73030 X-RAY EXAM OF SHOULDER: CPT

## 2023-01-16 PROCEDURE — 99284 EMERGENCY DEPT VISIT MOD MDM: CPT

## 2023-01-16 PROCEDURE — 72125 CT NECK SPINE W/O DYE: CPT

## 2023-01-16 PROCEDURE — 74011250637 HC RX REV CODE- 250/637: Performed by: EMERGENCY MEDICINE

## 2023-01-16 RX ORDER — DIAZEPAM 5 MG/1
2.5-5 TABLET ORAL
Qty: 15 TABLET | Refills: 0 | Status: SHIPPED | OUTPATIENT
Start: 2023-01-16 | End: 2023-01-21

## 2023-01-16 RX ORDER — OXYCODONE AND ACETAMINOPHEN 5; 325 MG/1; MG/1
1 TABLET ORAL
Status: COMPLETED | OUTPATIENT
Start: 2023-01-16 | End: 2023-01-16

## 2023-01-16 RX ORDER — CYCLOBENZAPRINE HCL 10 MG
10 TABLET ORAL ONCE
Status: COMPLETED | OUTPATIENT
Start: 2023-01-16 | End: 2023-01-16

## 2023-01-16 RX ORDER — PREDNISONE 50 MG/1
50 TABLET ORAL DAILY
Qty: 4 TABLET | Refills: 0 | Status: SHIPPED | OUTPATIENT
Start: 2023-01-16 | End: 2023-01-20

## 2023-01-16 RX ORDER — DICLOFENAC EPOLAMINE 0.01 G/1
1 PATCH TOPICAL EVERY 12 HOURS
Qty: 10 PATCH | Refills: 0 | Status: SHIPPED | OUTPATIENT
Start: 2023-01-16 | End: 2023-01-21

## 2023-01-16 RX ADMIN — OXYCODONE AND ACETAMINOPHEN 1 TABLET: 5; 325 TABLET ORAL at 20:07

## 2023-01-16 RX ADMIN — CYCLOBENZAPRINE 10 MG: 10 TABLET, FILM COATED ORAL at 22:23

## 2023-01-17 NOTE — DISCHARGE INSTRUCTIONS
1.  Start taking the new medications however discontinue the Medrol Dosepak and the methocarbamol. 2.  You can continue using the lidocaine patches however I prescribed you a different type of patch you can try applying if the lidocaine is not helping. 3.  Follow-up with your PCP and your spine specialist.  3.  For the Valium it is a potent muscle relaxer but it can cause sedation, drowsiness, dizziness and propensity to fall. Be very careful while taking it. Start by taking a half of a tablet initially, if it is ineffective after 45 minutes to an hour you can take the second half of the tablet you should not take more than a full tab every 6 hours. Return at anytime for worsening such as development of fever, loss of  strength, severe uncontrolled pain or any worsening in your general condition. Thank you for allowing us to provide you with excellent care today. We hope we addressed all of your concerns and needs. We strive to provide excellent quality care in the Emergency Department. Anything less than excellent does not meet our expectations for you. Incidental findings are findings on labs or imaging studies that do not necessarily correlate with the reason for your visit. We make every effort to inform you of these incidental findings and the proper follow-up, however it is important that you call your primary care doctor or a primary care doctor in 1 to 2 days to set up a follow-up visit so they can go through your results in detail with you, and determine if additional testing is needed. The emergency room is not intended to be complete or comprehensive care, and it serves as a means to rule out life-threatening pathologies. It is very important that you follow-up with your primary care doctor to arrange additional testing and/or treatment if needed.     The exam and treatment you received in the Emergency Department were for an urgent problem and are not intended as complete care. It is important that you follow-up with a doctor, nurse practitioner, or physician assistant to:  (1) confirm your diagnosis,  (2) re-evaluation of changes in your illness and treatment, and  (3) for ongoing care. If your symptoms become worse or you do not improve as expected, or you develop any new symptoms that concern you and/or you are unable to reach your usual health care provider, you should return to the Emergency Department. We are available 24 hours a day. If your blood pressure is greater than 120/80, your blood pressure is considered elevated above normal and you need to follow up with your primary care physician or the physician provided on your discharge instructions for a blood pressure recheck. If you were prescribed narcotic medications such as hydrocodone, oxycodone, diazepam, lorazepam, alprazolam, tramadol or codeine, these medications will NOT typically be refilled in the Emergency Room. Follow up with your primary care doctor or specialist to discuss this, or you may return to the Emergency room if your condition worsens. 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 CT SPINE CERV WO CONT   Final Result   No acute fracture. 3 mm anterolisthesis of C3 with respect to C4 and   3 mm retrolisthesis at C4 with respect to C5. Multilevel cervical degenerative   changes, most extensive at the C3-C4 level where there is severe right neural   foraminal narrowing, as described above. Diagnosis:   1. Strain of levator scapulae muscle, right, subsequent encounter    2. Osteoarthritis of cervical spine, unspecified spinal osteoarthritis complication status    3. 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    If you have any problem arranging the follow-up visit, contact [phone] ([phone])    Make an appointment with your Primary Care doctor for follow up of this visit. Return to the ER if you are unable to be seen in the time recommended on your discharge instructions. It has been a pleasure caring for you today. Return to the ER or seek medical care for any worsening in your condition at any time. Connectipity Activation    Thank you for requesting access to Connectipity. Please follow the instructions below to securely access and download your online medical record. Connectipity allows you to send messages to your doctor, view your test results, renew your prescriptions, schedule appointments, and more. How Do I Sign Up? In your internet browser, go to [email or web address]  Click on the First Time User? Click Here link in the Sign In box. You will be redirect to the New Member Sign Up page. Enter your Connectipity Access Code exactly as it appears below. You will not need to use this code after youve completed the sign-up process. If you do not sign up before the expiration date, you must request a new code. Connectipity Access Code: Activation code not generated  Current Connectipity Status: Active (This is the date your Connectipity access code will )    Enter the last four digits of your Social Security Number (xxxx) and Date of Birth (mm/dd/yyyy) as indicated and click Submit. You will be taken to the next sign-up page. Create a Connectipity ID. This will be your Connectipity login ID and cannot be changed, so think of one that is secure and easy to remember. Create a Connectipity password. You can change your password at any time. Enter your Password Reset Question and Answer. This can be used at a later time if you forget your password. Enter your e-mail address. You will receive e-mail notification when new information is available in [address] Click Sign Up. You can now view and download portions of your medical record. Click the [place] link to download a portable copy of your medical information.     Additional Information    If you have questions, please visit the Frequently Asked Questions section of the [employer] website at [email or web address] Remember, [employer] is NOT to be used for urgent needs. For medical emergencies, dial 911.

## 2023-01-17 NOTE — ED PROVIDER NOTES
EMERGENCY DEPARTMENT HISTORY AND PHYSICAL EXAM      Date: 1/16/2023  Patient Name: Little Reis      History of Presenting Illness     Chief Complaint   Patient presents with    Neck Pain     Seen last Thursday for same, given steroids with no relief. Reports trouble sleeping. Reports tightness. 9/10       History Provided By: Patient  Location/Duration/Severity/Modifying factors   Patient is a 68-year-old female presenting with right-sided neck and shoulder pain. She states its been going on for the past approximately 9 days. Is located on the right trapezius and right shoulder region. It worsens when she turns her head to the right. Is not related to exertion or inspiration. She states she was seen on Thursday of last week for the same symptoms and was prescribed evidently methocarbamol and a Medrol Dosepak but states that the symptoms have persisted. The lidocaine patch did not seem to be helping. She states she had very similar symptoms in the past due to muscle spasms but usually responds to the typical treatment, but so far this episode has not responded. She denies any loss of strength, numbness, tingling or weakness in the extremities. Denies any fever. There is been no significant trauma. She states that feels similar to previous muscle spasms she has had in the past.      Neck Pain   Pertinent negatives include no chest pain. There are no other complaints, changes, or physical findings at this time. PCP: Glenna Cevallos MD    Current Outpatient Medications   Medication Sig Dispense Refill    diazePAM (VALIUM) 5 mg tablet Take 0.5-1 Tablets by mouth every six (6) hours as needed for Muscle Spasm(s) for up to 5 days. Max Daily Amount: 20 mg. Take 0.5 tab, if ineffective, take another 1/2 tab about 45-60 minutes. Don't take more than 5mg(1 whole tab) every 6 hours. Can cause sedation and drowsiness. Do not combine with Alcohol or other narcotics.  15 Tablet 0    predniSONE (DELTASONE) 50 mg tablet Take 1 Tablet by mouth daily for 4 days. 4 Tablet 0    diclofenac (FLECTOR) 1.3 % pt12 1 Patch by TransDERmal route every twelve (12) hours for 5 days. 10 Patch 0    lidocaine 4 % patch Apply to affected area every 12 hours as needed pain 10 Each 0    ibandronate (BONIVA) 150 mg tablet TAKE 1 TABLET BY MOUTH ONCE A MONTH 1 Tablet 5    pantoprazole (PROTONIX) 40 mg tablet Take 1 Tablet by mouth daily. 90 Tablet 3    rOPINIRole (REQUIP) 2 mg tablet Take 1 Tablet by mouth daily. 90 Tablet 4    rivaroxaban (XARELTO) 10 mg tablet Take 1 Tablet by mouth daily. 90 Tablet 1    atorvastatin (LIPITOR) 20 mg tablet Take 1 Tablet by mouth daily. 90 Tablet 1    diclofenac EC (VOLTAREN) 50 mg EC tablet Take 1 Tablet by mouth two (2) times a day. 180 Tablet 4    atenoloL (TENORMIN) 50 mg tablet Take  by mouth daily. calcium citrate 200 mg (950 mg) tablet Take  by mouth two (2) times a day. nicotinic acid (NIACIN) 500 mg tablet Take 500 mg by mouth two (2) times daily (with meals). vitamin E (AQUA GEMS) 400 unit capsule Take  by mouth daily.          Past History     Past Medical History:  Past Medical History:   Diagnosis Date    Arthritis 7/30/2020    Atrial fibrillation ([address] 7/30/2020    Back ache 7/30/2020    Disorder of bone and articular cartilage 7/30/2020    GERD (gastroesophageal reflux disease) 7/30/2020    Hearing reduced 06/2017    Hypertension     Pure hypercholesterolemia 7/30/2020    Vitamin D deficiency 7/30/2020       Past Surgical History:  Past Surgical History:   Procedure Laterality Date    HX CATARACT REMOVAL  11/2012    HX COLONOSCOPY  01/26/2012    DUe 2022    HX ORTHOPAEDIC  12/01/2018    hernated disc repair    HX ORTHOPAEDIC  02/25/2020    lumbar surgery-L2-5 cage fusion    HX OTHER SURGICAL Bilateral 08/2017    Bilateral Laser Treatment    HX OTHER SURGICAL  01/2021    gum surgery    HX TUBAL LIGATION  1981    [place] UNLISTED PROCEDURE CARDIAC SURGERY  2014    a-fib procedure Family History:  Family History   Problem Relation Age of Onset    Cancer Mother         breast    Breast Cancer Mother     Heart Disease Father     Cancer Brother     Lung Cancer Other     Lung Cancer Other     Lung Cancer Other     Breast Cancer Sister     Heart Disease Brother     Heart Disease Brother     Cancer Sister     Cancer Sister     Cancer Sister     Cancer Brother     Cancer Brother     Cancer Brother     Cancer Brother     Cancer Brother        Social History:  Social History     Tobacco Use    Smoking status: Never    Smokeless tobacco: Never   Vaping Use    Vaping Use: Never used   Substance Use Topics    Alcohol use: Not Currently     Alcohol/week: 0.0 standard drinks    Drug use: Never       Allergies:  No Known Allergies    Medications:  Current Outpatient Medications   Medication Sig Dispense Refill    diazePAM (VALIUM) 5 mg tablet Take 0.5-1 Tablets by mouth every six (6) hours as needed for Muscle Spasm(s) for up to 5 days. Max Daily Amount: 20 mg. Take 0.5 tab, if ineffective, take another 1/2 tab about 45-60 minutes. Don't take more than 5mg(1 whole tab) every 6 hours. Can cause sedation and drowsiness. Do not combine with Alcohol or other narcotics. 15 Tablet 0    predniSONE (DELTASONE) 50 mg tablet Take 1 Tablet by mouth daily for 4 days. 4 Tablet 0    diclofenac (FLECTOR) 1.3 % pt12 1 Patch by TransDERmal route every twelve (12) hours for 5 days. 10 Patch 0    lidocaine 4 % patch Apply to affected area every 12 hours as needed pain 10 Each 0    ibandronate (BONIVA) 150 mg tablet TAKE 1 TABLET BY MOUTH ONCE A MONTH 1 Tablet 5    pantoprazole (PROTONIX) 40 mg tablet Take 1 Tablet by mouth daily. 90 Tablet 3    rOPINIRole (REQUIP) 2 mg tablet Take 1 Tablet by mouth daily. 90 Tablet 4    rivaroxaban (XARELTO) 10 mg tablet Take 1 Tablet by mouth daily. 90 Tablet 1    atorvastatin (LIPITOR) 20 mg tablet Take 1 Tablet by mouth daily.  90 Tablet 1    diclofenac EC (VOLTAREN) 50 mg EC tablet Take 1 Tablet by mouth two (2) times a day. 180 Tablet 4    atenoloL (TENORMIN) 50 mg tablet Take  by mouth daily. calcium citrate 200 mg (950 mg) tablet Take  by mouth two (2) times a day. nicotinic acid (NIACIN) 500 mg tablet Take 500 mg by mouth two (2) times daily (with meals). vitamin E (AQUA GEMS) 400 unit capsule Take  by mouth daily. Social Determinants of Health:  Social Determinants of Health     Tobacco Use: Low Risk     Smoking Tobacco Use: Never    Smokeless Tobacco Use: Never    Passive Exposure: Not on file   Alcohol Use: Not At Risk    Frequency of Alcohol Consumption: Never    Average Number of Drinks: Patient does not drink    Frequency of Binge Drinking: Never   Financial Resource Strain: Low Risk     Difficulty of Paying Living Expenses: Not very hard   Food Insecurity: No Food Insecurity    Worried About Running Out of Food in the Last Year: Never true    Ran Out of Food in the Last Year: Never true   Transportation Needs: No Transportation Needs    Lack of Transportation (Medical): No    Lack of Transportation (Non-Medical): No   Physical Activity: Inactive    Days of Exercise per Week: 0 days    Minutes of Exercise per Session: 0 min   Stress: No Stress Concern Present    Feeling of Stress : Not at all   Social Connections: Socially Integrated    Frequency of Communication with Friends and Family: Three times a week    Frequency of Social Gatherings with Friends and Family:  Three times a week    Attends Sabianist Services: More than 4 times per year    Active Member of Clubs or Organizations: Yes    Attends Club or Organization Meetings: More than 4 times per year    Marital Status:    Intimate Partner Violence: Not At Risk    Fear of Current or Ex-Partner: No    Emotionally Abused: No    Physically Abused: No    Sexually Abused: No   Depression: Not at risk    PHQ-2 Score: 0   Housing Stability: [address] to Pay for Housing in the Last Year: No Number of Places Lived in the Last Year: 1    Unstable Housing in the Last Year: No     Good access to primary and/or specialist care. Reports generally stable financial, home and living environment. Review of Systems     Review of Systems   Constitutional:  Negative for fever. HENT:  Negative for congestion and rhinorrhea. Eyes:  Negative for visual disturbance. Respiratory:  Negative for shortness of breath and wheezing. Cardiovascular:  Negative for chest pain. Gastrointestinal:  Negative for abdominal pain. Genitourinary:  Negative for difficulty urinating and dysuria. Musculoskeletal:  Positive for neck pain. Negative for back pain. Physical Exam     Physical Exam  Constitutional:       General: She is not in acute distress. Appearance: Normal appearance. She is not ill-appearing or toxic-appearing. HENT:      Head: Normocephalic and atraumatic. Right Ear: External ear normal.      Left Ear: External ear normal.      Nose: Nose normal. No congestion. Mouth/Throat:      Mouth: Mucous membranes are moist.   Eyes:      Conjunctiva/sclera: Conjunctivae normal.   Cardiovascular:      Pulses: Normal pulses. Pulmonary:      Effort: Pulmonary effort is normal.   Abdominal:      General: Abdomen is flat. Musculoskeletal:         General: Tenderness present. Normal range of motion. Cervical back: Neck supple. Comments: Obvious soft tissue tension and muscular tension to the right trapezius muscle as well as the right levator scapula with tenderness at the insertion point of the scapula. No midline spinal tenderness. Good  strength, normal sensation. There is no redness or warmth. Skin:     General: Skin is warm and dry. Neurological:      General: No focal deficit present. Mental Status: She is alert. Lab and Diagnostic Study Results     Labs -  No results found for this or any previous visit (from the past 24 hour(s)).       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 CT SPINE CERV WO CONT   Final Result   No acute fracture. 3 mm anterolisthesis of C3 with respect to C4 and   3 mm retrolisthesis at C4 with respect to C5. Multilevel cervical degenerative   changes, most extensive at the C3-C4 level where there is severe right neural   foraminal narrowing, as described above. Medical Decision Making and ED Course   - I am the first and primary provider for this patient AND AM THE PRIMARY PROVIDER OF RECORD. - I reviewed the vital signs, available nursing notes, past medical history, past surgical history, family history and social history. - Initial assessment performed. The patients presenting problems have been discussed, and the staff are in agreement with the care plan formulated and outlined with them. I have encouraged them to ask questions as they arise throughout their visit. Vital Signs-Reviewed the patient's vital signs. 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 DDx: Muscle spasm, spinal fracture, she does not have any concerning features for spinal epidural abscess or epidural hematoma.   She is on anticoagulation although all her symptoms seem to be in the muscle belly. Will do a screening CAT scan given is her second visit and based on her advanced age. We will check an x-ray of her right shoulder to look for mass or fracture. She may need a different muscle relaxer, consider Valium at a low dose. CT and x-ray not revealing of a clear cause for her pain. I do think that the hydroxyapatite likely the cause for her pain. Seems very much muscular in nature patient advised to start taking the Valium as needed counseled the patient on appropriate medication use. Will start with half of a tablet every 6 hours as needed and she can only take a second half if it is ineffective. Vies to call PCP for follow-up as soon as possible return to emergency room if worse. ED Course:     ED Course as of 01/17/23 0412 Mon Jan 16, 2023 2213 Reports some improvement in her symptoms. Will start on Valium advise discontinue previous medications we will put her on prednisone burst as opposed to a Medrol Dosepak. [NIMA]      ED Course User Index  [NIMA] Adilene Winkler DO       _________________________________________________________________    At this time, patient is stable and appropriate for discharge home. Patient demonstrates understanding of current diagnoses and is in agreement with the treatment plan. They are advised that while the likelihood of serious underlying condition is low at this point given the evaluation performed today, we cannot fully rule it out. They are advised to immediately return with any new symptoms or worsening of current condition. Any Incidental findings were noted on the patient's discharge paperwork as well as verbally directly to the patient, and the appropriate follow-up was given to the patient as far as instructions on testing needed as well as the timeframe. All questions have been answered. Patient is given educational material regarding their diagnoses, including danger symptoms and when to return to the ED.      This note was dictated utilizing Dragon voice recognition software. Unfortunately this leads to occasional typographical errors. I apologize in advance if the situation occurs. If questions occur please do not hesitate to contact me directly. Cindy Nagel, DO          Procedures and Critical Care   Performed by: Cindy Nagel, DO  Procedures         Cindy Nagel DO      Diagnosis:   1. Strain of levator scapulae muscle, right, subsequent encounter    2. Osteoarthritis of cervical spine, unspecified spinal osteoarthritis complication status    3. 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, [address]   [place] [id number]  [phone]      [facility], [address] [place] [address] Orthopedic Surgery Call [address] [place]  [phone]              Discharge Medication List as of 1/16/2023 10:28 PM        START taking these medications    Details   diazePAM (VALIUM) 5 mg tablet Take 0.5-1 Tablets by mouth every six (6) hours as needed for Muscle Spasm(s) for up to 5 days. Max Daily Amount: 20 mg. Take 0.5 tab, if ineffective, take another 1/2 tab about 45-60 minutes. Don't take more than 5mg(1 whole tab) every 6 hours. Can caus e sedation and drowsiness. Do not combine with Alcohol or other narcotics. , Normal, Disp-15 Tablet, R-0      predniSONE (DELTASONE) 50 mg tablet Take 1 Tablet by mouth daily for 4 days. , Normal, Disp-4 Tablet, R-0      diclofenac (FLECTOR) 1.3 % pt12 1 Patch by TransDERmal route every twelve (12) hours for 5 days. , Normal, Disp-10 Patch, R-0           CONTINUE these medications which have NOT CHANGED    Details   lidocaine 4 % patch Apply to affected area every 12 hours as needed pain, Normal, Disp-10 Each, R-0      ibandronate (BONIVA) 150 mg tablet TAKE 1 TABLET BY MOUTH ONCE A MONTH, Normal, Disp-1 Tablet, R-5      pantoprazole (PROTONIX) 40 mg tablet Take 1 Tablet by mouth daily. , Normal, Disp-90 Tablet, R-3      rOPINIRole (REQUIP) 2 mg tablet Take 1 Tablet by mouth daily. , Normal, Disp-90 Tablet, R-4      rivaroxaban (XARELTO) 10 mg tablet Take 1 Tablet by mouth daily. , Normal, Disp-90 Tablet, R-1      atorvastatin (LIPITOR) 20 mg tablet Take 1 Tablet by mouth daily. , Normal, Disp-90 Tablet, R-1      diclofenac EC (VOLTAREN) 50 mg EC tablet Take 1 Tablet by mouth two (2) times a day., Normal, Disp-180 Tablet, R-4      atenoloL (TENORMIN) 50 mg tablet Take  by mouth daily. , Historical Med      calcium citrate 200 mg (950 mg) tablet Take  by mouth two (2) times a day., Historical Med      nicotinic acid (NIACIN) 500 mg tablet Take 500 mg by mouth two (2) times daily (with meals). , Historical Med      vitamin E (AQUA GEMS) 400 unit capsule Take  by mouth daily. , Historical Med           STOP taking these medications       methylPREDNISolone (Medrol, Ted,) 4 mg tablet Comments:   Reason for Stopping:         methocarbamoL (ROBAXIN) 500 mg tablet Comments:   Reason for Stopping:               Patient seen in the context of the Novel Coronavirus (COVID19) pandemic, utilizing contemporary protocols and evidence based on the most up to date available evidence, understanding that the current evidence has the potential to change as additional information becomes available. This note is dictated utilizing Dragon voice recognition software. Unfortunately this leads to occasional typographical errors using the voice recognition. I apologize in advance if the situation occurs. If questions occur please do not hesitate to contact me directly.     Nehemiah Way, DO

## 2023-01-26 ENCOUNTER — TRANSCRIBE ORDER (OUTPATIENT)
Dept: SCHEDULING | Age: 78
End: 2023-01-26

## 2023-01-26 DIAGNOSIS — Z12.31 SCREENING MAMMOGRAM FOR HIGH-RISK PATIENT: Primary | ICD-10-CM

## 2023-02-01 ENCOUNTER — PATIENT OUTREACH (OUTPATIENT)
Dept: CASE MANAGEMENT | Age: 78
End: 2023-02-01

## 2023-02-01 NOTE — PROGRESS NOTES
Ambulatory Care Management Note    Date/Time:  2/1/2023 11:02 AM    This patient was received as a referral from [facility]. Ambulatory Care Manager outreached to patient today to offer care management services. Introduction to self and role of care manager provided. Patient accepted care management services at this time. Follow up call scheduled at this time. Patient has Ambulatory Care Manager's contact number for any questions or concerns. Patient went to the ER 1/12/23 and 1/16/23 for neck spasm/pain, has a follow up with Dr Ami Duron 2/8/23, waiting to see if she needs to make a follow up with Dr Michael covarrubias after that appt.

## 2023-02-08 ENCOUNTER — OFFICE VISIT (OUTPATIENT)
Dept: PRIMARY CARE CLINIC | Age: 78
End: 2023-02-08
Payer: MEDICARE

## 2023-02-08 VITALS
WEIGHT: 178 LBS | BODY MASS INDEX: 28.61 KG/M2 | RESPIRATION RATE: 16 BRPM | TEMPERATURE: 97.6 F | DIASTOLIC BLOOD PRESSURE: 67 MMHG | HEART RATE: 60 BPM | OXYGEN SATURATION: 97 % | SYSTOLIC BLOOD PRESSURE: 130 MMHG | HEIGHT: 66 IN

## 2023-02-08 DIAGNOSIS — M54.2 NECK PAIN: Primary | ICD-10-CM

## 2023-02-08 PROCEDURE — 1123F ACP DISCUSS/DSCN MKR DOCD: CPT

## 2023-02-08 PROCEDURE — 99213 OFFICE O/P EST LOW 20 MIN: CPT

## 2023-02-08 RX ORDER — IBANDRONATE SODIUM 150 MG/1
TABLET, FILM COATED ORAL
COMMUNITY

## 2023-02-08 NOTE — PROGRESS NOTES
Chief Complaint   Patient presents with    Neck Pain     Visit Vitals  /67 (BP 1 Location: Left upper arm, BP Patient Position: Sitting)   Pulse 60   Temp 97.6 °F (36.4 °C) (Axillary)   Resp 16   Ht 5' 6\" (1.676 m)   Wt 178 lb (80.7 kg)   SpO2 97%   BMI 28.73 kg/m²     1. \"Have you been to the ER, urgent care clinic since your last visit? Hospitalized since your last visit? \" Yes When: 1/13/2023 for neck    2. \"Have you seen or consulted any other health care providers outside of the [address] [place] [address] since your last visit? \" No     3. For patients aged 39-70: Has the patient had a colonoscopy / FIT/ Cologuard? No      If the patient is female:    4. For patients aged 41-77: Has the patient had a mammogram within the past 2 years? No      5. For patients aged 21-65: Has the patient had a pap smear?  NA - based on age or sex

## 2023-02-08 NOTE — PROGRESS NOTES
Sari العرايق is a 78 y.o. female presents for    Chief Complaint   Patient presents with    Neck Pain    . ASSESSMENT and PLAN  Diagnoses and all orders for this visit:    1. Neck pain  Comments:  Seen in the ER, much better now. Discussed ROM exercises to prevent further flares. HISTORY OF PRESENT ILLNESS  Sari العراقي is a 78 y.o. female presents for    Chief Complaint   Patient presents with    Neck Pain    . Patient reports right side neck pain/spasms, going into her right shoulder. She was seen in the ER twice for this. They gave her steroids and muscle relaxer. She is feeling much better now and denies further neck pain. Icy hot gives her relief as well. Vitals:    02/08/23 1023   BP: 130/67   Pulse: 60   Resp: 16   Temp: 97.6 °F (36.4 °C)   TempSrc: Axillary   SpO2: 97%   Weight: 178 lb (80.7 kg)   Height: 5' 6\" (1.676 m)     Patient Active Problem List   Diagnosis Code    Arthritis M19.90    Atrial fibrillation (HCC) I48.91    Back ache M54.9    Disorder of bone and articular cartilage M89.9, M94.9    GERD (gastroesophageal reflux disease) K21.9    Pure hypercholesterolemia E78.00    Vitamin D deficiency E55.9    Restless leg syndrome G25.81    S/P lumbar laminectomy Z98.890     Patient Active Problem List    Diagnosis Date Noted    S/P lumbar laminectomy 04/01/2022    Restless leg syndrome 08/12/2020    Arthritis 07/30/2020    Atrial fibrillation ([address] 07/30/2020    Back ache 07/30/2020    Disorder of bone and articular cartilage 07/30/2020    GERD (gastroesophageal reflux disease) 07/30/2020    Pure hypercholesterolemia 07/30/2020    Vitamin D deficiency 07/30/2020     Outpatient Medications Marked as Taking for the 2/8/23 encounter (Office Visit) with Kelsey Woodward RN   Medication Sig Dispense Refill    ibandronate (BONIVA) 150 mg tablet Boniva 150 mg tablet   Take 1 tablet every month by oral route. pantoprazole (PROTONIX) 40 mg tablet Take 1 Tablet by mouth daily.  [address] Tablet 3    rOPINIRole (REQUIP) 2 mg tablet Take 1 Tablet by mouth daily. 90 Tablet 4    rivaroxaban (XARELTO) 10 mg tablet Take 1 Tablet by mouth daily. 90 Tablet 1    atorvastatin (LIPITOR) 20 mg tablet Take 1 Tablet by mouth daily. 90 Tablet 1    diclofenac EC (VOLTAREN) 50 mg EC tablet Take 1 Tablet by mouth two (2) times a day. 180 Tablet 4    atenoloL (TENORMIN) 50 mg tablet Take  by mouth daily. calcium citrate 200 mg (950 mg) tablet Take  by mouth two (2) times a day. nicotinic acid (NIACIN) 500 mg tablet Take 500 mg by mouth two (2) times daily (with meals). vitamin E (AQUA GEMS) 400 unit capsule Take  by mouth daily.         No Known Allergies  Past Medical History:   Diagnosis Date    Arthritis 7/30/2020    Atrial fibrillation ([address] 7/30/2020    Back ache 7/30/2020    Disorder of bone and articular cartilage 7/30/2020    GERD (gastroesophageal reflux disease) 7/30/2020    Hearing reduced 06/2017    Hypertension     Pure hypercholesterolemia 7/30/2020    Vitamin D deficiency 7/30/2020     Past Surgical History:   Procedure Laterality Date    HX CATARACT REMOVAL  11/2012    HX COLONOSCOPY  01/26/2012    DUe 2022    HX ORTHOPAEDIC  12/01/2018    hernated disc repair    HX ORTHOPAEDIC  02/25/2020    lumbar surgery-L2-5 cage fusion    HX OTHER SURGICAL Bilateral 08/2017    Bilateral Laser Treatment    HX OTHER SURGICAL  01/2021    gum surgery    HX TUBAL LIGATION  1981    [place] UNLISTED PROCEDURE CARDIAC SURGERY  2014    a-fib procedure     Family History   Problem Relation Age of Onset    Cancer Mother         breast    Breast Cancer Mother     Heart Disease Father     Cancer Brother     Lung Cancer Other     Lung Cancer Other     Lung Cancer Other     Breast Cancer Sister     Heart Disease Brother     Heart Disease Brother     Cancer Sister     Cancer Sister     Cancer Sister     Cancer Brother     Cancer Brother     Cancer Brother     Cancer Brother     Cancer Brother      Social History Tobacco Use    Smoking status: Never    Smokeless tobacco: Never   Substance Use Topics    Alcohol use: Not Currently     Alcohol/week: 0.0 standard drinks           Review of Systems   Constitutional: Negative. HENT: Negative. Eyes: Negative. Respiratory: Negative. Cardiovascular: Negative. Gastrointestinal: Negative. Genitourinary: Negative. Musculoskeletal:  Positive for neck pain. Skin: Negative. Neurological: Negative. Endo/Heme/Allergies: Negative. Psychiatric/Behavioral: Negative. Physical Exam  Constitutional:       Appearance: She is obese. HENT:      Nose: Nose normal.   Eyes:      Conjunctiva/sclera: Conjunctivae normal.   Cardiovascular:      Rate and Rhythm: Normal rate and regular rhythm. Pulmonary:      Effort: Pulmonary effort is normal.      Breath sounds: Normal breath sounds. Musculoskeletal:         General: Normal range of motion. Cervical back: Normal range of motion. No swelling or tenderness. Normal range of motion. Comments: Full ROM to neck, no TTP   Neurological:      Mental Status: She is alert.    Psychiatric:         Mood and Affect: Mood normal.         Behavior: Behavior normal.             DIGNA Simmons

## 2023-02-13 DIAGNOSIS — E78.00 PURE HYPERCHOLESTEROLEMIA: ICD-10-CM

## 2023-02-14 RX ORDER — DICLOFENAC SODIUM 50 MG/1
50 TABLET, DELAYED RELEASE ORAL 2 TIMES DAILY
Qty: 180 TABLET | Refills: 4 | Status: SHIPPED | OUTPATIENT
Start: 2023-02-14

## 2023-02-14 RX ORDER — ATORVASTATIN CALCIUM 20 MG/1
20 TABLET, FILM COATED ORAL DAILY
Qty: 90 TABLET | Refills: 1 | Status: SHIPPED | OUTPATIENT
Start: 2023-02-14

## 2023-02-20 ENCOUNTER — HOSPITAL ENCOUNTER (OUTPATIENT)
Dept: MAMMOGRAPHY | Age: 78
Discharge: HOME | End: 2023-02-20
Attending: FAMILY MEDICINE
Payer: MEDICARE

## 2023-02-20 DIAGNOSIS — Z12.31 SCREENING MAMMOGRAM FOR HIGH-RISK PATIENT: ICD-10-CM

## 2023-02-20 PROCEDURE — 77063 BREAST TOMOSYNTHESIS BI: CPT

## 2023-02-22 NOTE — PROGRESS NOTES
[employer] message sent:      Good afternoon Ms. Javier,    Your mammogram did not show evidence of cancer. Benign calcifications and a benign cyst in the right breast were noted.   We will discuss future mammography screenings at your next wellness exam.    Dr. Kathi Sanchez

## 2023-02-24 ENCOUNTER — PATIENT OUTREACH (OUTPATIENT)
Dept: CASE MANAGEMENT | Age: 78
End: 2023-02-24

## 2023-02-24 NOTE — PROGRESS NOTES
02/24/23  11:08 AM  Ambulatory Care Management Note    Date/Time:  2/24/2023 11:08 AM    Patient Current Location: [place]    Patient has graduated from the Complex Case Management  program on 02/24/23. Patient/family has the ability to self-manage at this time. Patient reports that she's doing well and has no further need for follow up from this ACM at this time. She agrees to contact if services needed in the future. No further Ambulatory Care Manager follow up scheduled. Goals Addressed    None         Patient has Ambulatory Care Manager's contact information for any further questions, concerns, or needs.   Patients upcoming visits:    Future Appointments   Date Time Provider [place]   7/13/2023  1:30 PM Gennaro Garcia MD [facility] BS AMB

## 2023-06-26 ENCOUNTER — TELEPHONE (OUTPATIENT)
Dept: PRIMARY CARE CLINIC | Facility: CLINIC | Age: 78
End: 2023-06-26

## 2023-06-27 RX ORDER — IBANDRONATE SODIUM 150 MG/1
TABLET, FILM COATED ORAL
Qty: 3 TABLET | Refills: 1 | Status: SHIPPED | OUTPATIENT
Start: 2023-06-27

## 2023-07-10 RX ORDER — ATORVASTATIN CALCIUM 20 MG/1
20 TABLET, FILM COATED ORAL DAILY
Qty: 30 TABLET | Refills: 1 | Status: SHIPPED | OUTPATIENT
Start: 2023-07-10

## 2023-08-01 ENCOUNTER — HOSPITAL ENCOUNTER (EMERGENCY)
Facility: HOSPITAL | Age: 78
Discharge: HOME | End: 2023-08-01
Attending: EMERGENCY MEDICINE
Payer: MEDICARE

## 2023-08-01 VITALS
RESPIRATION RATE: 18 BRPM | TEMPERATURE: 98 F | WEIGHT: 180 LBS | OXYGEN SATURATION: 97 % | HEIGHT: 66 IN | BODY MASS INDEX: 28.93 KG/M2 | SYSTOLIC BLOOD PRESSURE: 156 MMHG | HEART RATE: 63 BPM | DIASTOLIC BLOOD PRESSURE: 78 MMHG

## 2023-08-01 DIAGNOSIS — S39.012A BACK STRAIN, INITIAL ENCOUNTER: ICD-10-CM

## 2023-08-01 DIAGNOSIS — M62.838 SPASM OF MUSCLE: Primary | ICD-10-CM

## 2023-08-01 PROCEDURE — 99284 EMERGENCY DEPT VISIT MOD MDM: CPT

## 2023-08-01 PROCEDURE — 96372 THER/PROPH/DIAG INJ SC/IM: CPT

## 2023-08-01 PROCEDURE — 6360000002 HC RX W HCPCS: Performed by: EMERGENCY MEDICINE

## 2023-08-01 PROCEDURE — 6370000000 HC RX 637 (ALT 250 FOR IP): Performed by: EMERGENCY MEDICINE

## 2023-08-01 RX ORDER — METHOCARBAMOL 750 MG/1
750 TABLET, FILM COATED ORAL 4 TIMES DAILY
Qty: 40 TABLET | Refills: 0 | Status: SHIPPED | OUTPATIENT
Start: 2023-08-01 | End: 2023-08-11

## 2023-08-01 RX ORDER — METHOCARBAMOL 500 MG/1
750 TABLET, FILM COATED ORAL
Status: COMPLETED | OUTPATIENT
Start: 2023-08-01 | End: 2023-08-01

## 2023-08-01 RX ORDER — KETOROLAC TROMETHAMINE 30 MG/ML
30 INJECTION, SOLUTION INTRAMUSCULAR; INTRAVENOUS
Status: COMPLETED | OUTPATIENT
Start: 2023-08-01 | End: 2023-08-01

## 2023-08-01 RX ADMIN — METHOCARBAMOL TABLETS 750 MG: 500 TABLET, COATED ORAL at 07:51

## 2023-08-01 RX ADMIN — KETOROLAC TROMETHAMINE 30 MG: 30 INJECTION, SOLUTION INTRAMUSCULAR; INTRAVENOUS at 07:51

## 2023-08-01 ASSESSMENT — PAIN SCALES - GENERAL: PAINLEVEL_OUTOF10: 9

## 2023-08-01 ASSESSMENT — PAIN - FUNCTIONAL ASSESSMENT: PAIN_FUNCTIONAL_ASSESSMENT: 0-10

## 2023-08-01 NOTE — ED PROVIDER NOTES
[facility] EMERGENCY DEPT  EMERGENCY DEPARTMENT HISTORY AND PHYSICAL EXAM      Date: 8/1/2023  Patient Name: Mike Moran  MRN: [id number]  [address] [facility] [place] 1945  Date of evaluation: 8/1/2023  Provider: Efren Cote MD   Note Started: 7:54 AM EDT 8/1/23    HISTORY OF PRESENT ILLNESS     Chief Complaint   Patient presents with    Back Pain       History Provided By: Patient    HPI: Mike Moran is a 78 y.o. female with history of atrial fibrillation on Xarelto, backaches, arthritis of the back and degenerative disease, presenting with back pain. Patient states that she woke up at 2 AM this morning with severe back spasms in the lower back. States that she was on a porch swing yesterday and not sure if she exacerbated it that way. No heavy lifting. Denies any head trauma, loss of consciousness, urinary symptoms.     PAST MEDICAL HISTORY   Past Medical History:  Past Medical History:   Diagnosis Date    Arthritis 7/30/2020    Atrial fibrillation ([address]) 7/30/2020    Back ache 7/30/2020    Disorder of bone and articular cartilage 7/30/2020    GERD (gastroesophageal reflux disease) 7/30/2020    Hearing reduced 06/2017    Hypertension     Pure hypercholesterolemia 7/30/2020    Vitamin D deficiency 7/30/2020       Past Surgical History:  Past Surgical History:   Procedure Laterality Date    CATARACT REMOVAL  11/2012    COLONOSCOPY  01/26/2012    DUe 2022    ORTHOPEDIC SURGERY  02/25/2020    lumbar surgery-L2-5 cage fusion    ORTHOPEDIC SURGERY  12/01/2018    hernated disc repair    OTHER SURGICAL HISTORY Bilateral 08/2017    Bilateral Laser Treatment    OTHER SURGICAL HISTORY  01/2021    gum surgery    [place] UNLISTED PROCEDURE CARDIAC SURGERY  2014    a-fib procedure    TUBAL LIGATION  1981       Family History:  Family History   Problem Relation Age of Onset    Cancer Brother     Cancer Brother     Cancer Mother         breast    Breast Cancer Mother     Heart Disease Father     Cancer Brother     Lung Cancer Other     Lung

## 2023-08-04 ENCOUNTER — TELEPHONE (OUTPATIENT)
Dept: PRIMARY CARE CLINIC | Facility: CLINIC | Age: 78
End: 2023-08-04

## 2023-08-04 NOTE — TELEPHONE ENCOUNTER
Mindy Yoo is requesting a refill on Boniva 150 .    Patient's last appointment was 2-  Next visit is scheduled for 9/13/2023   Please send medication to:   [facility]/pharmacy #[id number] [place] - [address] [facility] [phone] - F [phone]  [address] [place] [address]  Phone: [phone] Fax: [phone]

## 2023-08-07 ENCOUNTER — TELEPHONE (OUTPATIENT)
Dept: PRIMARY CARE CLINIC | Facility: CLINIC | Age: 78
End: 2023-08-07

## 2023-08-07 NOTE — TELEPHONE ENCOUNTER
Previous Dr. Garnica patient called requesting RX-Boniva 150 mg be sent to [address] [place] [address], patient stated that the pharmacy did not receive the last one that was sent in June. Patient provided fax number of [phone] for rx to be sent to. She advised she took her last one this morning.     Next appt: 09/13/23  Last appt: 02/08/23

## 2023-08-08 RX ORDER — IBANDRONATE SODIUM 150 MG/1
TABLET, FILM COATED ORAL
Qty: 3 TABLET | Refills: 1 | Status: SHIPPED | OUTPATIENT
Start: 2023-08-08

## 2023-09-06 RX ORDER — ATORVASTATIN CALCIUM 20 MG/1
TABLET, FILM COATED ORAL
Qty: 90 TABLET | Refills: 1 | Status: SHIPPED | OUTPATIENT
Start: 2023-09-06

## 2023-09-10 SDOH — HEALTH STABILITY: PHYSICAL HEALTH: ON AVERAGE, HOW MANY DAYS PER WEEK DO YOU ENGAGE IN MODERATE TO STRENUOUS EXERCISE (LIKE A BRISK WALK)?: 0 DAYS

## 2023-09-13 ENCOUNTER — OFFICE VISIT (OUTPATIENT)
Dept: PRIMARY CARE CLINIC | Facility: CLINIC | Age: 78
End: 2023-09-13
Payer: MEDICARE

## 2023-09-13 VITALS
WEIGHT: 182 LBS | BODY MASS INDEX: 29.25 KG/M2 | HEART RATE: 62 BPM | SYSTOLIC BLOOD PRESSURE: 119 MMHG | HEIGHT: 66 IN | TEMPERATURE: 97.9 F | DIASTOLIC BLOOD PRESSURE: 62 MMHG | RESPIRATION RATE: 16 BRPM | OXYGEN SATURATION: 97 %

## 2023-09-13 DIAGNOSIS — K21.9 GASTROESOPHAGEAL REFLUX DISEASE WITHOUT ESOPHAGITIS: ICD-10-CM

## 2023-09-13 DIAGNOSIS — E78.2 MIXED HYPERLIPIDEMIA: ICD-10-CM

## 2023-09-13 DIAGNOSIS — Z00.00 MEDICARE ANNUAL WELLNESS VISIT, SUBSEQUENT: Primary | ICD-10-CM

## 2023-09-13 DIAGNOSIS — I48.0 PAROXYSMAL ATRIAL FIBRILLATION (HCC): ICD-10-CM

## 2023-09-13 PROCEDURE — G0439 PPPS, SUBSEQ VISIT: HCPCS | Performed by: NURSE PRACTITIONER

## 2023-09-13 PROCEDURE — 99213 OFFICE O/P EST LOW 20 MIN: CPT | Performed by: NURSE PRACTITIONER

## 2023-09-13 PROCEDURE — 1123F ACP DISCUSS/DSCN MKR DOCD: CPT | Performed by: NURSE PRACTITIONER

## 2023-09-13 RX ORDER — DOXYCYCLINE HYCLATE 100 MG/1
CAPSULE ORAL
COMMUNITY
End: 2023-09-13

## 2023-09-13 RX ORDER — ATENOLOL 50 MG/1
50 TABLET ORAL DAILY
Qty: 30 TABLET | Refills: 11 | COMMUNITY
Start: 2023-02-08 | End: 2024-02-08

## 2023-09-13 RX ORDER — VITAMIN E 268 MG
CAPSULE ORAL DAILY
COMMUNITY

## 2023-09-13 RX ORDER — GABAPENTIN 300 MG/1
CAPSULE ORAL
COMMUNITY
End: 2023-09-13

## 2023-09-13 RX ORDER — BACLOFEN 10 MG/1
TABLET ORAL
COMMUNITY

## 2023-09-13 SDOH — ECONOMIC STABILITY: FOOD INSECURITY: WITHIN THE PAST 12 MONTHS, THE FOOD YOU BOUGHT JUST DIDN'T LAST AND YOU DIDN'T HAVE MONEY TO GET MORE.: NEVER TRUE

## 2023-09-13 SDOH — ECONOMIC STABILITY: HOUSING INSECURITY
IN THE LAST 12 MONTHS, WAS THERE A TIME WHEN YOU DID NOT HAVE A STEADY PLACE TO SLEEP OR SLEPT IN A SHELTER (INCLUDING NOW)?: NO

## 2023-09-13 SDOH — ECONOMIC STABILITY: FOOD INSECURITY: WITHIN THE PAST 12 MONTHS, YOU WORRIED THAT YOUR FOOD WOULD RUN OUT BEFORE YOU GOT MONEY TO BUY MORE.: NEVER TRUE

## 2023-09-13 SDOH — ECONOMIC STABILITY: INCOME INSECURITY: HOW HARD IS IT FOR YOU TO PAY FOR THE VERY BASICS LIKE FOOD, HOUSING, MEDICAL CARE, AND HEATING?: NOT HARD AT ALL

## 2023-09-13 ASSESSMENT — PATIENT HEALTH QUESTIONNAIRE - PHQ9
SUM OF ALL RESPONSES TO PHQ QUESTIONS 1-9: 0
1. LITTLE INTEREST OR PLEASURE IN DOING THINGS: 0
2. FEELING DOWN, DEPRESSED OR HOPELESS: 0
SUM OF ALL RESPONSES TO PHQ QUESTIONS 1-9: 0
SUM OF ALL RESPONSES TO PHQ9 QUESTIONS 1 & 2: 0

## 2023-09-13 ASSESSMENT — LIFESTYLE VARIABLES
HOW OFTEN DO YOU HAVE A DRINK CONTAINING ALCOHOL: NEVER
HOW MANY STANDARD DRINKS CONTAINING ALCOHOL DO YOU HAVE ON A TYPICAL DAY: PATIENT DOES NOT DRINK

## 2023-09-14 LAB
ALBUMIN SERPL-MCNC: 4.1 G/DL (ref 3.8–4.8)
ALBUMIN/GLOB SERPL: 1.6 {RATIO} (ref 1.2–2.2)
ALP SERPL-CCNC: 41 IU/L (ref 44–121)
ALT SERPL-CCNC: 23 IU/L (ref 0–32)
AST SERPL-CCNC: 24 IU/L (ref 0–40)
BILIRUB SERPL-MCNC: 1.4 MG/DL (ref 0–1.2)
BUN SERPL-MCNC: 16 MG/DL (ref 8–27)
BUN/CREAT SERPL: 19 (ref 12–28)
CALCIUM SERPL-MCNC: 8.9 MG/DL (ref 8.7–10.3)
CHLORIDE SERPL-SCNC: 108 MMOL/L (ref 96–106)
CHOLEST SERPL-MCNC: 155 MG/DL (ref 100–199)
CO2 SERPL-SCNC: 24 MMOL/L (ref 20–29)
CREAT SERPL-MCNC: 0.86 MG/DL (ref 0.57–1)
EGFRCR SERPLBLD CKD-EPI 2021: 70 ML/MIN/1.73
ERYTHROCYTE [DISTWIDTH] IN BLOOD BY AUTOMATED COUNT: 13.3 % (ref 11.7–15.4)
GLOBULIN SER CALC-MCNC: 2.5 G/DL (ref 1.5–4.5)
GLUCOSE SERPL-MCNC: 147 MG/DL (ref 70–99)
HCT VFR BLD AUTO: 36.9 % (ref 34–46.6)
HDLC SERPL-MCNC: 41 MG/DL
HGB BLD-MCNC: 12.3 G/DL (ref 11.1–15.9)
LDLC SERPL CALC-MCNC: 85 MG/DL (ref 0–99)
MCH RBC QN AUTO: 31.1 PG (ref 26.6–33)
MCHC RBC AUTO-ENTMCNC: 33.3 G/DL (ref 31.5–35.7)
MCV RBC AUTO: 93 FL (ref 79–97)
PLATELET # BLD AUTO: 194 X10E3/UL (ref 150–450)
POTASSIUM SERPL-SCNC: 4 MMOL/L (ref 3.5–5.2)
PROT SERPL-MCNC: 6.6 G/DL (ref 6–8.5)
RBC # BLD AUTO: 3.96 X10E6/UL (ref 3.77–5.28)
SODIUM SERPL-SCNC: 144 MMOL/L (ref 134–144)
TRIGL SERPL-MCNC: 168 MG/DL (ref 0–149)
VLDLC SERPL CALC-MCNC: 29 MG/DL (ref 5–40)
WBC # BLD AUTO: 5.3 X10E3/UL (ref 3.4–10.8)

## 2023-11-20 ENCOUNTER — TELEPHONE (OUTPATIENT)
Dept: PRIMARY CARE CLINIC | Facility: CLINIC | Age: 78
End: 2023-11-20

## 2023-11-20 RX ORDER — IBANDRONATE SODIUM 150 MG/1
TABLET, FILM COATED ORAL
Qty: 3 TABLET | Refills: 1 | Status: SHIPPED | OUTPATIENT
Start: 2023-11-20

## 2023-11-20 NOTE — TELEPHONE ENCOUNTER
Sarkis Leahy is requesting a refill on ibandronate .      Patient's last appointment was 9/13/2023  Next visit is scheduled for Visit date not found   Please send medication to:   [address]  [address] [facility] [address]  Phone: [phone] Fax: [phone]

## 2023-12-14 ENCOUNTER — TELEPHONE (OUTPATIENT)
Dept: PRIMARY CARE CLINIC | Facility: CLINIC | Age: 78
End: 2023-12-14

## 2023-12-14 RX ORDER — PANTOPRAZOLE SODIUM 40 MG/1
40 TABLET, DELAYED RELEASE ORAL DAILY
Qty: 90 TABLET | Refills: 1 | Status: SHIPPED | OUTPATIENT
Start: 2023-12-14

## 2023-12-14 NOTE — TELEPHONE ENCOUNTER
Mireya Leong is requesting a refill on pantoprazole .      Patient's last appointment was 9/13/2023  Next visit is scheduled for Visit date not found   Please send medication to:   [address]  [address] [place] [address]  Phone: [phone] Fax: [phone]

## 2024-01-03 RX ORDER — ROPINIROLE 2 MG/1
2 TABLET, FILM COATED ORAL DAILY
Qty: 90 TABLET | Refills: 0 | Status: SHIPPED | OUTPATIENT
Start: 2024-01-03

## 2024-01-23 ENCOUNTER — TELEPHONE (OUTPATIENT)
Dept: PRIMARY CARE CLINIC | Facility: CLINIC | Age: 79
End: 2024-01-23

## 2024-01-23 DIAGNOSIS — M81.0 AGE RELATED OSTEOPOROSIS, UNSPECIFIED PATHOLOGICAL FRACTURE PRESENCE: ICD-10-CM

## 2024-01-23 DIAGNOSIS — Z12.31 ENCOUNTER FOR SCREENING MAMMOGRAM FOR MALIGNANT NEOPLASM OF BREAST: Primary | ICD-10-CM

## 2024-01-23 NOTE — TELEPHONE ENCOUNTER
----- Message from Kaitlynn Coulter sent at 1/23/2024 10:46 AM EST -----  Subject: Referral Request    Reason for referral request? Pt called in stating that she needs a   mamogram and bone density test done. Pt is stating that she needs a   referral sent to [facility]. If the practice could give the pt   a call back to advise to when the referral has been sent.   Phone:[phone]  Provider patient wants to be referred to(if known):     Provider Phone Number(if known):    Additional Information for Provider?   ---------------------------------------------------------------------------  --------------  CALL BACK INFO    [phone]; OK to leave message on voicemail,OK to respond with electronic   message via [employer] portal (only for patients who have registered [employer]   account)  ---------------------------------------------------------------------------  --------------

## 2024-01-25 ENCOUNTER — TRANSCRIBE ORDERS (OUTPATIENT)
Facility: HOSPITAL | Age: 79
End: 2024-01-25

## 2024-01-25 DIAGNOSIS — Z12.31 SCREENING MAMMOGRAM FOR HIGH-RISK PATIENT: Primary | ICD-10-CM

## 2024-01-26 ENCOUNTER — APPOINTMENT (OUTPATIENT)
Facility: HOSPITAL | Age: 79
End: 2024-01-26
Payer: MEDICARE

## 2024-01-26 ENCOUNTER — HOSPITAL ENCOUNTER (OUTPATIENT)
Facility: HOSPITAL | Age: 79
End: 2024-01-26
Payer: MEDICARE

## 2024-01-26 VITALS — WEIGHT: 182 LBS | BODY MASS INDEX: 29.25 KG/M2 | HEIGHT: 66 IN

## 2024-01-26 DIAGNOSIS — M81.0 AGE RELATED OSTEOPOROSIS, UNSPECIFIED PATHOLOGICAL FRACTURE PRESENCE: ICD-10-CM

## 2024-01-26 PROCEDURE — 77080 DXA BONE DENSITY AXIAL: CPT

## 2024-02-21 ENCOUNTER — HOSPITAL ENCOUNTER (OUTPATIENT)
Facility: HOSPITAL | Age: 79
Discharge: HOME | End: 2024-02-24
Payer: MEDICARE

## 2024-02-21 DIAGNOSIS — Z12.31 SCREENING MAMMOGRAM FOR HIGH-RISK PATIENT: ICD-10-CM

## 2024-02-21 PROCEDURE — 77063 BREAST TOMOSYNTHESIS BI: CPT

## 2024-03-14 ENCOUNTER — TELEPHONE (OUTPATIENT)
Dept: PRIMARY CARE CLINIC | Facility: CLINIC | Age: 79
End: 2024-03-14

## 2024-03-14 NOTE — TELEPHONE ENCOUNTER
Patient called in requesting a refill for diclofenac (VOLTAREN) 50 MG EC tablet. Has an appointment 04/18/2024

## 2024-03-19 RX ORDER — ROPINIROLE 2 MG/1
2 TABLET, FILM COATED ORAL DAILY
Qty: 90 TABLET | Refills: 3 | Status: SHIPPED | OUTPATIENT
Start: 2024-03-19

## 2024-04-04 ENCOUNTER — OFFICE VISIT (OUTPATIENT)
Dept: PRIMARY CARE CLINIC | Facility: CLINIC | Age: 79
End: 2024-04-04
Payer: MEDICARE

## 2024-04-04 VITALS
HEART RATE: 62 BPM | OXYGEN SATURATION: 98 % | WEIGHT: 181.8 LBS | DIASTOLIC BLOOD PRESSURE: 74 MMHG | SYSTOLIC BLOOD PRESSURE: 130 MMHG | TEMPERATURE: 98.6 F | HEIGHT: 66 IN | BODY MASS INDEX: 29.22 KG/M2 | RESPIRATION RATE: 16 BRPM

## 2024-04-04 DIAGNOSIS — E55.9 VITAMIN D DEFICIENCY: ICD-10-CM

## 2024-04-04 DIAGNOSIS — E66.3 OVERWEIGHT (BMI 25.0-29.9): ICD-10-CM

## 2024-04-04 DIAGNOSIS — E78.2 MIXED HYPERLIPIDEMIA: ICD-10-CM

## 2024-04-04 DIAGNOSIS — I10 ESSENTIAL (PRIMARY) HYPERTENSION: ICD-10-CM

## 2024-04-04 DIAGNOSIS — Z86.39 H/O HYPERGLYCEMIA: Primary | ICD-10-CM

## 2024-04-04 DIAGNOSIS — G25.81 RESTLESS LEGS: ICD-10-CM

## 2024-04-04 DIAGNOSIS — M81.0 OSTEOPOROSIS, UNSPECIFIED OSTEOPOROSIS TYPE, UNSPECIFIED PATHOLOGICAL FRACTURE PRESENCE: ICD-10-CM

## 2024-04-04 PROCEDURE — 99214 OFFICE O/P EST MOD 30 MIN: CPT | Performed by: PREVENTIVE MEDICINE

## 2024-04-04 PROCEDURE — G8399 PT W/DXA RESULTS DOCUMENT: HCPCS | Performed by: PREVENTIVE MEDICINE

## 2024-04-04 PROCEDURE — G8427 DOCREV CUR MEDS BY ELIG CLIN: HCPCS | Performed by: PREVENTIVE MEDICINE

## 2024-04-04 PROCEDURE — 3078F DIAST BP <80 MM HG: CPT | Performed by: PREVENTIVE MEDICINE

## 2024-04-04 PROCEDURE — 1036F TOBACCO NON-USER: CPT | Performed by: PREVENTIVE MEDICINE

## 2024-04-04 PROCEDURE — G8419 CALC BMI OUT NRM PARAM NOF/U: HCPCS | Performed by: PREVENTIVE MEDICINE

## 2024-04-04 PROCEDURE — 1123F ACP DISCUSS/DSCN MKR DOCD: CPT | Performed by: PREVENTIVE MEDICINE

## 2024-04-04 PROCEDURE — 3075F SYST BP GE 130 - 139MM HG: CPT | Performed by: PREVENTIVE MEDICINE

## 2024-04-04 PROCEDURE — 1090F PRES/ABSN URINE INCON ASSESS: CPT | Performed by: PREVENTIVE MEDICINE

## 2024-04-04 RX ORDER — ATORVASTATIN CALCIUM 20 MG/1
20 TABLET, FILM COATED ORAL DAILY
Qty: 90 TABLET | Refills: 1 | Status: SHIPPED | OUTPATIENT
Start: 2024-04-04

## 2024-04-04 RX ORDER — IBANDRONATE SODIUM 150 MG/1
TABLET, FILM COATED ORAL
Qty: 3 TABLET | Refills: 1 | Status: SHIPPED | OUTPATIENT
Start: 2024-04-04

## 2024-04-04 RX ORDER — ROPINIROLE 2 MG/1
2 TABLET, FILM COATED ORAL DAILY
Qty: 90 TABLET | Refills: 1 | Status: SHIPPED | OUTPATIENT
Start: 2024-04-04

## 2024-04-04 ASSESSMENT — PATIENT HEALTH QUESTIONNAIRE - PHQ9
SUM OF ALL RESPONSES TO PHQ QUESTIONS 1-9: 0
1. LITTLE INTEREST OR PLEASURE IN DOING THINGS: NOT AT ALL
SUM OF ALL RESPONSES TO PHQ QUESTIONS 1-9: 0
SUM OF ALL RESPONSES TO PHQ9 QUESTIONS 1 & 2: 0
2. FEELING DOWN, DEPRESSED OR HOPELESS: NOT AT ALL
SUM OF ALL RESPONSES TO PHQ QUESTIONS 1-9: 0
SUM OF ALL RESPONSES TO PHQ QUESTIONS 1-9: 0

## 2024-04-04 NOTE — PROGRESS NOTES
Leslie Merino is a 78 y.o. female who was seen in clinic today (4/4/24).    Assessment & Plan:   Below is the assessment and plan developed based on review of pertinent history, physical exam, labs, studies, and medications.    1. H/O hyperglycemia  -     AMB POC HEMOGLOBIN A1C  2. Overweight (BMI 25.0-29.9)  3. Vitamin D deficiency  -     Vitamin D 25 Hydroxy  4. Mixed hyperlipidemia  -     Lipid Panel  -     atorvastatin (LIPITOR) 20 MG tablet; Take 1 tablet by mouth daily, Disp-90 tablet, R-1Normal  5. Essential (primary) hypertension  -     Comprehensive Metabolic Panel  -     CBC with Auto Differential  6. Osteoporosis, unspecified osteoporosis type, unspecified pathological fracture presence  -     ibandronate (BONIVA) 150 MG tablet; Boniva 150 mg tablet  Take 1 tablet every month by oral route., Disp-3 tablet, R-1Normal  7. Restless legs  -     rOPINIRole (REQUIP) 2 MG tablet; Take 1 tablet by mouth daily, Disp-90 tablet, R-1Normal    Hemoglobin A1c today was WNL.  Patient Instructions   [employer] Anti-Stress Sinus Pillow Heat for 1 minute in Microwave (approximately) and place on head;reheat at 30 sec intervals.     OTC Timothy Emu for low back pain    [employer] Glucosamine Sulfate 1000 1-2 capsules daily.      [employer] Sublingual Liquid B-complex - 1ml weekly. Hold under tongue for a minute and swallow.     Geronimo Lopez 6 Week Body Makeover by [employer] - get DVD version on [employer]. Make certain you buy a complete kit, answer the questions, determine body type and follow that plan.  Prep meals for 2 weeks at a time and put them in individual plastic containers so you are ready to go. Track what you ate and how much you lost. If you lost 5 lbs one week and only 2 the next, repeat the eating plan that got the most weight off!      Return if symptoms worsen or fail to improve.    Subjective:   Leslie was seen today for Medication Refill  She is on Diclofenac tablets, which she is not sure worked

## 2024-04-04 NOTE — PROGRESS NOTES
\"Have you been to the ER, urgent care clinic since your last visit?  Hospitalized since your last visit?\"    NO    “Have you seen or consulted any other health care providers outside of [facility] since your last visit?”    NO

## 2024-04-04 NOTE — PATIENT INSTRUCTIONS
[employer] Anti-Stress Sinus Pillow Heat for 1 minute in Microwave (approximately) and place on head;reheat at 30 sec intervals.     OTC Timothy Emu for low back pain    [employer] Glucosamine Sulfate 1000 1-2 capsules daily.      Nature[employer]ounty Sublingual Liquid B-complex - 1ml weekly. Hold under tongue for a minute and swallow.     Geronimo Lopez 6 Week Body Makeover by [employer] - get DVD version on [employer]. Make certain you buy a complete kit, answer the questions, determine body type and follow that plan.  Prep meals for 2 weeks at a time and put them in individual plastic containers so you are ready to go. Track what you ate and how much you lost. If you lost 5 lbs one week and only 2 the next, repeat the eating plan that got the most weight off!

## 2024-04-06 LAB
25(OH)D3+25(OH)D2 SERPL-MCNC: 35.5 NG/ML (ref 30–100)
ALBUMIN SERPL-MCNC: 4.7 G/DL (ref 3.8–4.8)
ALBUMIN/GLOB SERPL: 1.9 {RATIO} (ref 1.2–2.2)
ALP SERPL-CCNC: 39 IU/L (ref 44–121)
ALT SERPL-CCNC: 22 IU/L (ref 0–32)
AST SERPL-CCNC: 25 IU/L (ref 0–40)
BASOPHILS # BLD AUTO: 0 X10E3/UL (ref 0–0.2)
BASOPHILS NFR BLD AUTO: 1 %
BILIRUB SERPL-MCNC: 1.5 MG/DL (ref 0–1.2)
BUN SERPL-MCNC: 11 MG/DL (ref 8–27)
BUN/CREAT SERPL: 14 (ref 12–28)
CALCIUM SERPL-MCNC: 9.6 MG/DL (ref 8.7–10.3)
CHLORIDE SERPL-SCNC: 104 MMOL/L (ref 96–106)
CHOLEST SERPL-MCNC: 170 MG/DL (ref 100–199)
CO2 SERPL-SCNC: 25 MMOL/L (ref 20–29)
CREAT SERPL-MCNC: 0.77 MG/DL (ref 0.57–1)
EGFRCR SERPLBLD CKD-EPI 2021: 80 ML/MIN/1.73
EOSINOPHIL # BLD AUTO: 0.1 X10E3/UL (ref 0–0.4)
EOSINOPHIL NFR BLD AUTO: 2 %
ERYTHROCYTE [DISTWIDTH] IN BLOOD BY AUTOMATED COUNT: 13 % (ref 11.7–15.4)
GLOBULIN SER CALC-MCNC: 2.5 G/DL (ref 1.5–4.5)
GLUCOSE SERPL-MCNC: 102 MG/DL (ref 70–99)
HCT VFR BLD AUTO: 41.1 % (ref 34–46.6)
HDLC SERPL-MCNC: 50 MG/DL
HGB BLD-MCNC: 13.5 G/DL (ref 11.1–15.9)
IMM GRANULOCYTES # BLD AUTO: 0 X10E3/UL (ref 0–0.1)
IMM GRANULOCYTES NFR BLD AUTO: 0 %
LDLC SERPL CALC-MCNC: 92 MG/DL (ref 0–99)
LYMPHOCYTES # BLD AUTO: 1.5 X10E3/UL (ref 0.7–3.1)
LYMPHOCYTES NFR BLD AUTO: 29 %
MCH RBC QN AUTO: 31 PG (ref 26.6–33)
MCHC RBC AUTO-ENTMCNC: 32.8 G/DL (ref 31.5–35.7)
MCV RBC AUTO: 95 FL (ref 79–97)
MONOCYTES # BLD AUTO: 0.6 X10E3/UL (ref 0.1–0.9)
MONOCYTES NFR BLD AUTO: 12 %
NEUTROPHILS # BLD AUTO: 2.9 X10E3/UL (ref 1.4–7)
NEUTROPHILS NFR BLD AUTO: 56 %
PLATELET # BLD AUTO: 209 X10E3/UL (ref 150–450)
POTASSIUM SERPL-SCNC: 4.7 MMOL/L (ref 3.5–5.2)
PROT SERPL-MCNC: 7.2 G/DL (ref 6–8.5)
RBC # BLD AUTO: 4.35 X10E6/UL (ref 3.77–5.28)
SODIUM SERPL-SCNC: 144 MMOL/L (ref 134–144)
TRIGL SERPL-MCNC: 163 MG/DL (ref 0–149)
VLDLC SERPL CALC-MCNC: 28 MG/DL (ref 5–40)
WBC # BLD AUTO: 5.2 X10E3/UL (ref 3.4–10.8)

## 2024-04-06 ASSESSMENT — ENCOUNTER SYMPTOMS
BACK PAIN: 1
RESPIRATORY NEGATIVE: 1
GASTROINTESTINAL NEGATIVE: 1

## 2024-04-08 ENCOUNTER — NURSE ONLY (OUTPATIENT)
Dept: PRIMARY CARE CLINIC | Facility: CLINIC | Age: 79
End: 2024-04-08
Payer: MEDICARE

## 2024-04-08 DIAGNOSIS — R73.9 HYPERGLYCEMIA: Primary | ICD-10-CM

## 2024-04-08 LAB — HBA1C MFR BLD: 6 %

## 2024-04-08 PROCEDURE — 99211 OFF/OP EST MAY X REQ PHY/QHP: CPT | Performed by: PREVENTIVE MEDICINE

## 2024-04-14 DIAGNOSIS — E78.2 MIXED HYPERLIPIDEMIA: ICD-10-CM

## 2024-04-15 RX ORDER — ATORVASTATIN CALCIUM 20 MG/1
20 TABLET, FILM COATED ORAL DAILY
Qty: 90 TABLET | Refills: 3 | OUTPATIENT
Start: 2024-04-15

## 2024-05-09 RX ORDER — PANTOPRAZOLE SODIUM 40 MG/1
40 TABLET, DELAYED RELEASE ORAL DAILY
Qty: 90 TABLET | Refills: 3 | Status: SHIPPED | OUTPATIENT
Start: 2024-05-09

## 2024-11-17 ENCOUNTER — HOSPITAL ENCOUNTER (EMERGENCY)
Facility: HOSPITAL | Age: 79
Discharge: HOME | End: 2024-11-17
Payer: MEDICARE

## 2024-11-17 VITALS
HEIGHT: 66 IN | BODY MASS INDEX: 27.32 KG/M2 | HEART RATE: 68 BPM | WEIGHT: 170 LBS | SYSTOLIC BLOOD PRESSURE: 148 MMHG | RESPIRATION RATE: 20 BRPM | TEMPERATURE: 98 F | DIASTOLIC BLOOD PRESSURE: 75 MMHG | OXYGEN SATURATION: 98 %

## 2024-11-17 DIAGNOSIS — R21 RASH AND OTHER NONSPECIFIC SKIN ERUPTION: Primary | ICD-10-CM

## 2024-11-17 PROCEDURE — 99283 EMERGENCY DEPT VISIT LOW MDM: CPT

## 2024-11-17 RX ORDER — HYDROXYZINE HYDROCHLORIDE 25 MG/1
25 TABLET, FILM COATED ORAL EVERY 8 HOURS PRN
Qty: 30 TABLET | Refills: 0 | Status: SHIPPED | OUTPATIENT
Start: 2024-11-17 | End: 2024-11-27

## 2024-11-17 RX ORDER — METHYLPREDNISOLONE 4 MG/1
TABLET ORAL
Qty: 1 KIT | Refills: 0 | Status: SHIPPED | OUTPATIENT
Start: 2024-11-17

## 2024-11-17 RX ORDER — TRIAMCINOLONE ACETONIDE 0.25 MG/G
OINTMENT TOPICAL
Qty: 80 G | Refills: 1 | Status: SHIPPED | OUTPATIENT
Start: 2024-11-17 | End: 2024-11-24

## 2024-11-17 ASSESSMENT — LIFESTYLE VARIABLES
HOW MANY STANDARD DRINKS CONTAINING ALCOHOL DO YOU HAVE ON A TYPICAL DAY: PATIENT DOES NOT DRINK
HOW OFTEN DO YOU HAVE A DRINK CONTAINING ALCOHOL: NEVER

## 2024-11-17 ASSESSMENT — PAIN DESCRIPTION - LOCATION: LOCATION: CHEST

## 2024-11-17 ASSESSMENT — PAIN - FUNCTIONAL ASSESSMENT
PAIN_FUNCTIONAL_ASSESSMENT: 0-10
PAIN_FUNCTIONAL_ASSESSMENT: ACTIVITIES ARE NOT PREVENTED

## 2024-11-17 ASSESSMENT — PAIN SCALES - GENERAL: PAINLEVEL_OUTOF10: 9

## 2024-11-17 ASSESSMENT — PAIN DESCRIPTION - ORIENTATION: ORIENTATION: MID

## 2024-11-17 ASSESSMENT — PAIN DESCRIPTION - DESCRIPTORS: DESCRIPTORS: BURNING;ITCHING

## 2024-11-17 ASSESSMENT — PAIN DESCRIPTION - PAIN TYPE: TYPE: ACUTE PAIN

## 2024-11-17 NOTE — ED TRIAGE NOTES
Pt ambulatory to triage for rash on chest. Pt denies allergies, new soaps, medications. Pt reports burning and itching. Pt states hx of similar rash with concerns for shingles. PT also c/o nausea.

## 2024-11-17 NOTE — ED PROVIDER NOTES
tablet  Commonly known as: LIPITOR  Take 1 tablet by mouth daily     calcium citrate 950 (200 Ca) MG tablet  Commonly known as: CALCITRATE     ibandronate 150 MG tablet  Commonly known as: BONIVA  Boniva 150 mg tablet  Take 1 tablet every month by oral route.     niacin 500 MG tablet     pantoprazole 40 MG tablet  Commonly known as: PROTONIX  TAKE 1 TABLET EVERY DAY     rivaroxaban 10 MG Tabs tablet  Commonly known as: XARELTO     rOPINIRole 2 MG tablet  Commonly known as: REQUIP  Take 1 tablet by mouth daily     vitamin E 400 UNIT capsule               Where to Get Your Medications        These medications were sent to [facility]/pharmacy #[id number] - [place], [place] - [address] [facility] [address] SOTO [phone] - F [phone]  [address] [facility] [address] [place], [facility] [address]      Phone: [phone]   hydrOXYzine HCl 25 MG tablet  methylPREDNISolone 4 MG tablet  triamcinolone 0.025 % ointment           DISCONTINUED MEDICATIONS:  Current Discharge Medication List          I am the Primary Clinician of Record: Tolu Palacios PA-C (electronically signed)    (Please note that parts of this dictation were completed with voice recognition software. Quite often unanticipated grammatical, syntax, homophones, and other interpretive errors are inadvertently transcribed by the computer software. Please disregards these errors. Please excuse any errors that have escaped final proofreading.)     Tolu Palacios PA-C  11/17/24 2401

## 2024-12-15 ENCOUNTER — HOSPITAL ENCOUNTER (EMERGENCY)
Facility: HOSPITAL | Age: 79
Discharge: HOME | End: 2024-12-15
Attending: FAMILY MEDICINE
Payer: MEDICARE

## 2024-12-15 VITALS
BODY MASS INDEX: 27.32 KG/M2 | RESPIRATION RATE: 19 BRPM | HEIGHT: 66 IN | TEMPERATURE: 98.2 F | OXYGEN SATURATION: 99 % | DIASTOLIC BLOOD PRESSURE: 83 MMHG | SYSTOLIC BLOOD PRESSURE: 147 MMHG | HEART RATE: 63 BPM | WEIGHT: 170 LBS

## 2024-12-15 DIAGNOSIS — M79.10 MYALGIA: Primary | ICD-10-CM

## 2024-12-15 LAB
EKG ATRIAL RATE: 62 BPM
EKG DIAGNOSIS: NORMAL
EKG P AXIS: 48 DEGREES
EKG P-R INTERVAL: 184 MS
EKG Q-T INTERVAL: 434 MS
EKG QRS DURATION: 78 MS
EKG QTC CALCULATION (BAZETT): 440 MS
EKG R AXIS: 22 DEGREES
EKG T AXIS: 20 DEGREES
EKG VENTRICULAR RATE: 62 BPM

## 2024-12-15 PROCEDURE — 93005 ELECTROCARDIOGRAM TRACING: CPT | Performed by: FAMILY MEDICINE

## 2024-12-15 PROCEDURE — 6370000000 HC RX 637 (ALT 250 FOR IP): Performed by: FAMILY MEDICINE

## 2024-12-15 PROCEDURE — 99283 EMERGENCY DEPT VISIT LOW MDM: CPT

## 2024-12-15 RX ORDER — LIDOCAINE 4 G/G
1 PATCH TOPICAL
Status: DISCONTINUED | OUTPATIENT
Start: 2024-12-15 | End: 2024-12-15 | Stop reason: HOSPADM

## 2024-12-15 RX ORDER — CYCLOBENZAPRINE HCL 10 MG
10 TABLET ORAL 3 TIMES DAILY PRN
Qty: 15 TABLET | Refills: 0 | Status: SHIPPED | OUTPATIENT
Start: 2024-12-15 | End: 2024-12-25

## 2024-12-15 RX ORDER — LIDOCAINE 4 G/G
PATCH TOPICAL
Qty: 5 EACH | Refills: 0 | Status: SHIPPED | OUTPATIENT
Start: 2024-12-15

## 2024-12-15 ASSESSMENT — PAIN DESCRIPTION - LOCATION: LOCATION: NECK

## 2024-12-15 ASSESSMENT — PAIN SCALES - GENERAL: PAINLEVEL_OUTOF10: 9

## 2024-12-15 ASSESSMENT — PAIN DESCRIPTION - ORIENTATION: ORIENTATION: RIGHT

## 2024-12-15 ASSESSMENT — PAIN - FUNCTIONAL ASSESSMENT: PAIN_FUNCTIONAL_ASSESSMENT: 0-10

## 2024-12-15 NOTE — ED PROVIDER NOTES
tablet TAKE 1 TABLET EVERY DAY  Qty: 90 tablet, Refills: 3      ibandronate (BONIVA) 150 MG tablet Boniva 150 mg tablet  Take 1 tablet every month by oral route.  Qty: 3 tablet, Refills: 1    Associated Diagnoses: Osteoporosis, unspecified osteoporosis type, unspecified pathological fracture presence      rOPINIRole (REQUIP) 2 MG tablet Take 1 tablet by mouth daily  Qty: 90 tablet, Refills: 1    Associated Diagnoses: Restless legs      atorvastatin (LIPITOR) 20 MG tablet Take 1 tablet by mouth daily  Qty: 90 tablet, Refills: 1    Associated Diagnoses: Mixed hyperlipidemia      vitamin E 400 UNIT capsule Take by mouth daily      atenolol (TENORMIN) 50 MG tablet Take 1 tablet by mouth daily  Qty: 30 tablet, Refills: 11      calcium citrate (CALCITRATE) 950 (200 Ca) MG tablet Take by mouth 2 times daily      niacin 500 MG tablet Take 1 tablet by mouth 2 times daily (with meals)      rivaroxaban (XARELTO) 10 MG TABS tablet Take 1 tablet by mouth daily               2.  Return to ED if worse       3.      Medication List        START taking these medications      cyclobenzaprine 10 MG tablet  Commonly known as: FLEXERIL  Take 1 tablet by mouth 3 times daily as needed for Muscle spasms     lidocaine 4 % external patch  Apply to area of pain not to exceed 12 hours/day            ASK your doctor about these medications      atenolol 50 MG tablet  Commonly known as: TENORMIN     atorvastatin 20 MG tablet  Commonly known as: LIPITOR  Take 1 tablet by mouth daily     calcium citrate 950 (200 Ca) MG tablet  Commonly known as: CALCITRATE     ibandronate 150 MG tablet  Commonly known as: BONIVA  Boniva 150 mg tablet  Take 1 tablet every month by oral route.     methylPREDNISolone 4 MG tablet  Commonly known as: MEDROL (IVONNE)  Take by mouth.     niacin 500 MG tablet     pantoprazole 40 MG tablet  Commonly known as: PROTONIX  TAKE 1 TABLET EVERY DAY     rivaroxaban 10 MG Tabs tablet  Commonly known as: XARELTO     rOPINIRole 2 MG

## 2025-01-23 ENCOUNTER — TRANSCRIBE ORDERS (OUTPATIENT)
Facility: HOSPITAL | Age: 80
End: 2025-01-23

## 2025-01-23 DIAGNOSIS — Z12.31 VISIT FOR SCREENING MAMMOGRAM: Primary | ICD-10-CM

## 2025-02-24 ENCOUNTER — HOSPITAL ENCOUNTER (OUTPATIENT)
Facility: HOSPITAL | Age: 80
Discharge: HOME | End: 2025-02-27
Payer: COMMERCIAL

## 2025-02-24 DIAGNOSIS — Z12.31 VISIT FOR SCREENING MAMMOGRAM: ICD-10-CM

## 2025-02-24 PROCEDURE — 77067 SCR MAMMO BI INCL CAD: CPT

## 2025-04-30 NOTE — ASSESSMENT & PLAN NOTE
Recommended regular use of CPAP   Sees sleep medicine    S/p Ablation 2014 with Dr. Conner Pitts. Asymptomatic since. Follows with Dr. Wei Mccoy.